# Patient Record
Sex: FEMALE | Race: BLACK OR AFRICAN AMERICAN
[De-identification: names, ages, dates, MRNs, and addresses within clinical notes are randomized per-mention and may not be internally consistent; named-entity substitution may affect disease eponyms.]

---

## 2015-02-17 VITALS
DIASTOLIC BLOOD PRESSURE: 64 MMHG | DIASTOLIC BLOOD PRESSURE: 64 MMHG | SYSTOLIC BLOOD PRESSURE: 120 MMHG | SYSTOLIC BLOOD PRESSURE: 120 MMHG | SYSTOLIC BLOOD PRESSURE: 120 MMHG | SYSTOLIC BLOOD PRESSURE: 120 MMHG | DIASTOLIC BLOOD PRESSURE: 64 MMHG | DIASTOLIC BLOOD PRESSURE: 64 MMHG | DIASTOLIC BLOOD PRESSURE: 64 MMHG | SYSTOLIC BLOOD PRESSURE: 120 MMHG

## 2017-12-06 ENCOUNTER — HOSPITAL ENCOUNTER (OUTPATIENT)
Dept: HOSPITAL 63 - CT | Age: 37
Discharge: HOME | End: 2017-12-06
Attending: NURSE PRACTITIONER
Payer: COMMERCIAL

## 2017-12-06 DIAGNOSIS — K82.8: ICD-10-CM

## 2017-12-06 DIAGNOSIS — M47.896: ICD-10-CM

## 2017-12-06 DIAGNOSIS — N83.8: Primary | ICD-10-CM

## 2017-12-06 PROCEDURE — 74177 CT ABD & PELVIS W/CONTRAST: CPT

## 2017-12-06 NOTE — RAD
Examination: CT of the abdomen pelvis with IV contrast



History: History of chronic right lower quadrant abdominal pain, nausea,

vomiting



Comparison: None available



Technique: Axial CT images of the abdomen pelvis were performed with IV

contrast. Coronal and sagittal reformats are performed





PQRS Compliance Statement:



One or more of the following individualized dose reduction techniques were

utilized for this examination:

1. Automated exposure control

2. Adjustment of the mA and/or kV according to patient size

3. Use of iterative reconstruction technique



Findings:



The bibasal lungs are clear.



No evidence of free air identified in the abdomen.



Examination is somewhat limited due to delayed imaging after contrast

administration. Grossly the visualized liver, spleen, adrenals grossly appears

unremarkable. The gallbladder is mildly distended.



The stomach is mildly distended.



The visualized pancreas grossly appears unremarkable.



The small bowel is nondilated.



The appendix is normal.



Feces and gas noted in the colon.



There is a cystic structure identified in the left adnexa measuring 3.5 cm

could be a left ovarian cyst or cystic lesion. Heterogeneous appearance of the

uterus with the hypodensity identified abutting the endometrium measuring 3.5

cm..



The bilateral kidneys enhance symmetrically.



The caliber of the aorta grossly appears unremarkable.



Minimal degenerative changes lumbar spine.





Impression:



1. No acute intra-abdominal findings.



2. Heterogeneous appearance of the uterus with hypodensity identified abutting

the endometrium measuring 3.5 cm probably fibroids. Ultrasound pelvis can be

considered for follow-up if this is a clinical concern.



3. A 3.5 cm cyst is identified in the left adnexa probably left ovarian cyst

or cystic lesion. Ultrasound pelvis is recommended.

## 2018-01-04 ENCOUNTER — HOSPITAL ENCOUNTER (OUTPATIENT)
Dept: HOSPITAL 63 - PMG | Age: 38
Discharge: HOME | End: 2018-01-04
Attending: NURSE PRACTITIONER
Payer: COMMERCIAL

## 2018-01-04 ENCOUNTER — HOSPITAL ENCOUNTER (OUTPATIENT)
Dept: HOSPITAL 63 - US | Age: 38
Discharge: HOME | End: 2018-01-04
Payer: COMMERCIAL

## 2018-01-04 DIAGNOSIS — N83.202: Primary | ICD-10-CM

## 2018-01-04 DIAGNOSIS — R94.6: ICD-10-CM

## 2018-01-04 DIAGNOSIS — D25.9: ICD-10-CM

## 2018-01-04 DIAGNOSIS — N83.201: ICD-10-CM

## 2018-01-04 DIAGNOSIS — E05.90: ICD-10-CM

## 2018-01-04 DIAGNOSIS — R06.02: Primary | ICD-10-CM

## 2018-01-04 LAB
ALBUMIN SERPL-MCNC: 3.2 G/DL (ref 3.4–5)
ALBUMIN/GLOB SERPL: 0.9 {RATIO} (ref 1–1.7)
ALP SERPL-CCNC: 112 U/L (ref 46–116)
ALT SERPL-CCNC: 55 U/L (ref 14–59)
ANION GAP SERPL CALC-SCNC: 10 MMOL/L (ref 6–14)
AST SERPL-CCNC: 32 U/L (ref 15–37)
BILIRUB SERPL-MCNC: 0.2 MG/DL (ref 0.2–1)
BUN/CREAT SERPL: 16 (ref 6–20)
CA-I SERPL ISE-MCNC: 8 MG/DL (ref 7–20)
CALCIUM SERPL-MCNC: 9 MG/DL (ref 8.5–10.1)
CHLORIDE SERPL-SCNC: 107 MMOL/L (ref 98–107)
CO2 SERPL-SCNC: 25 MMOL/L (ref 21–32)
CREAT SERPL-MCNC: 0.5 MG/DL (ref 0.6–1)
GFR SERPLBLD BASED ON 1.73 SQ M-ARVRAT: 168 ML/MIN
GLOBULIN SER-MCNC: 3.7 G/DL (ref 2.2–3.8)
GLUCOSE SERPL-MCNC: 100 MG/DL (ref 70–99)
POTASSIUM SERPL-SCNC: 4 MMOL/L (ref 3.5–5.1)
PROT SERPL-MCNC: 6.9 G/DL (ref 6.4–8.2)
SODIUM SERPL-SCNC: 142 MMOL/L (ref 136–145)

## 2018-01-04 PROCEDURE — 84481 FREE ASSAY (FT-3): CPT

## 2018-01-04 PROCEDURE — 84439 ASSAY OF FREE THYROXINE: CPT

## 2018-01-04 PROCEDURE — 84443 ASSAY THYROID STIM HORMONE: CPT

## 2018-01-04 PROCEDURE — 76536 US EXAM OF HEAD AND NECK: CPT

## 2018-01-04 PROCEDURE — 80053 COMPREHEN METABOLIC PANEL: CPT

## 2018-01-04 PROCEDURE — 83880 ASSAY OF NATRIURETIC PEPTIDE: CPT

## 2018-01-04 PROCEDURE — 76856 US EXAM PELVIC COMPLETE: CPT

## 2018-01-04 PROCEDURE — 76830 TRANSVAGINAL US NON-OB: CPT

## 2018-01-04 PROCEDURE — 71046 X-RAY EXAM CHEST 2 VIEWS: CPT

## 2018-01-04 PROCEDURE — 36415 COLL VENOUS BLD VENIPUNCTURE: CPT

## 2018-01-04 PROCEDURE — 85379 FIBRIN DEGRADATION QUANT: CPT

## 2018-01-04 NOTE — RAD
Thyroid ultrasound, 1/4/2018:



History: Hyperthyroidism



The gland is generally enlarged with the right lobe measuring 6.4 x 2.4 x 2.4

cm while the left lobe measures 6.1 x 2.6 x 2.5 cm. The isthmus measures 0.8

cm AP dimension. The thyroid echo pattern is heterogeneous. The gland is

hypervascular.



There is a 6 mm smooth hyperechoic nodule present in the lower pole of the

right lobe of the gland. It does not demonstrate suspicious features.



There is a more complex 1.4 cm nodule present in the posterior aspect of the

lower pole of the right lobe of the gland. It demonstrates hypoechoic and

isoechoic components as well as a coarse calcification.



No discrete nodule is seen in the left lobe of the gland.



IMPRESSION:

1. Generally enlarged thyroid gland compatible with a toxic goiter.

2. Two right thyroid nodules as noted above. The larger one is mildly

suspicious due to the presence of internal calcification. Sonographic

follow-up is suggested.

## 2018-01-04 NOTE — RAD
Pelvic ultrasound, 1/4/2018:



History:  Abdominal pain, abnormal CT study



Transabdominal and transvaginal scans were obtained.



The uterus is enlarged measuring 13 cm in length. It demonstrates a

heterogeneous echo pattern. There is a 3.6 cm rounded, slightly hypoechoic,

heterogeneous mass in the posterior aspect of the uterus. This distorts the

central uterine echo complex, which measures approximately 1.1 cm in greatest

AP dimension. This posterior uterine mass was present on the 6/1/2016 study at

which time it measured approximately 5 cm. There is presumably represents a

uterine fibroid. A 1.8 cm hypoechoic nodule is seen anteriorly in the uterus.

Small nabothian cysts are noted in the cervical region.



There is a 2.2 cm predominantly cystic lesion in the right ovary. There are

low level internal echoes. This is probably a hemorrhagic cyst. The right

ovary is otherwise unremarkable.



The left ovary is within normal limits in size. The cystic structure seen in

the left adnexa on the 12/6/2017 CT study is not visible sonographically. This

may have been a functional cyst which has resolved.



The adnexal regions are otherwise unremarkable. A small amount of free fluid

is present in the pelvis.





IMPRESSION:

1. Fibroid uterus with the largest fibroid (3.6 cm) in the posterior aspect of

the uterus demonstrating an interval decrease in size since 6/1/2016.

2. Small right ovarian cyst containing debris, most likely a hemorrhagic cyst.

3. Small amount of free fluid in the pelvis.

## 2018-01-04 NOTE — RAD
2 views of the Chest  1/4/2018 2:00 AM



Indication: SHORTNESS OF BREATH



Comparison:  Chest radiograph August 12, 2016



Findings: There is no focal consolidation or infiltrate identified. There is

no effusion or pneumothorax. The cardiomediastinal silhouette and pulmonary

vasculature are within normal limits. No osseous abnormality is identified.



Impression: No evidence of acute cardiopulmonary process.

## 2018-01-05 ENCOUNTER — HOSPITAL ENCOUNTER (OUTPATIENT)
Dept: HOSPITAL 63 - CT | Age: 38
Discharge: HOME | End: 2018-01-05
Payer: COMMERCIAL

## 2018-01-05 DIAGNOSIS — R79.1: Primary | ICD-10-CM

## 2018-01-05 DIAGNOSIS — R91.8: ICD-10-CM

## 2018-01-05 LAB
T4 FREE SERPL-MCNC: 4.52 NG/DL (ref 0.76–1.46)
THYROID STIM HORMONE (TSH): < 0.007 UIU/ML (ref 0.36–3.74)

## 2018-01-05 PROCEDURE — 71275 CT ANGIOGRAPHY CHEST: CPT

## 2018-01-05 RX ADMIN — IOHEXOL ONE ML: 300 INJECTION, SOLUTION INTRAVENOUS at 11:33

## 2018-01-05 NOTE — RAD
INDICATION: Elevated d-dimer



COMPARISON: Chest x-ray 1 day prior



TECHNIQUE: Axial CT images obtained through the chest. Intravenous contrast

was utilized. Three-dimensional images processed per protocol.



FINDINGS:



There are some limitation secondary to contrast bolus timing and patient

motion.

No evidence of pneumothorax.

There are some minimal groundglass opacities within left greater than right

lung.

Portions of the thoracic aorta are obscured by motion. Ascending thoracic

aorta measures approximately 38 mm and descending thoracic aorta approximately

2 cm.

Triangle shaped soft tissue density anterior mediastinum.

Calcification at right lobe of thyroid.

There is some irregularity of the inferior endplate of T5 with some sclerosis

in the area.

No embolus in main, right main or left main pulmonary artery. Peripheral

evaluation is very limited secondary to patient motion and suboptimal contrast



IMPRESSION:



1. No embolus in main pulmonary arteries. Peripheral evaluation is very

limited secondary to motion.





2. Soft tissue density in the anterior mediastinum. Could be from causes such

as residual thymus.





3. Mild groundglass opacities in left greater than right lung. Could be

hypoventilatory changes or small airway inflammation.



4. Irregularity of the inferior endplate of T5. Could be from causes such at

Schmorl's node unless the patient has a history of injury to this region.





PQRS Compliance Statement:



One or more of the following individualized dose reduction techniques were

utilized for this examination:

1. Automated exposure control

2. Adjustment of the mA and/or kV according to patient size

3. Use of iterative reconstruction technique

## 2018-02-28 ENCOUNTER — HOSPITAL ENCOUNTER (OUTPATIENT)
Dept: HOSPITAL 61 - ECHO | Age: 38
Discharge: HOME | End: 2018-02-28
Attending: INTERNAL MEDICINE
Payer: COMMERCIAL

## 2018-02-28 DIAGNOSIS — I07.1: ICD-10-CM

## 2018-02-28 DIAGNOSIS — I27.20: Primary | ICD-10-CM

## 2018-02-28 PROCEDURE — 93306 TTE W/DOPPLER COMPLETE: CPT

## 2019-09-04 ENCOUNTER — HOSPITAL ENCOUNTER (OUTPATIENT)
Dept: HOSPITAL 63 - MAMMO | Age: 39
Discharge: HOME | End: 2019-09-04
Attending: PHYSICIAN ASSISTANT
Payer: MEDICAID

## 2019-09-04 DIAGNOSIS — N63.0: ICD-10-CM

## 2019-09-04 DIAGNOSIS — R92.8: Primary | ICD-10-CM

## 2019-09-04 PROCEDURE — 77066 DX MAMMO INCL CAD BI: CPT

## 2019-09-04 NOTE — RAD
DATE: 9/4/2019



EXAM: DIGITAL DIAGNOSTIC BILATERAL



HISTORY: Swelling in left breast after tetanus shot.



COMPARISON: 11/9/2016



This study was interpreted with the benefit of Computerized Aided Detection

(CAD).





Breast Density:  HETERO The breast parenchyma is heterogenously dense, which

could reduce sensitivity of mammography. Breast parenchyma level C.





FINDINGS: 

 2-D CC and MLO views are provided.



Right breast: There are no suspicious microcalcifications, masses or areas of

architectural distortion.



Left breast: There are no suspicious microcalcifications, masses or areas of

architectural distortion.



Bilateral mammograms compared to prior examinations and appears unchanged.

  





IMPRESSION: Negative bilateral mammogram. Note that a normal mammogram does

not preclude additional imaging if symptoms warrant.







BI-RADS CATEGORY: 1 NEGATIVE



RECOMMENDED FOLLOW-UP: 12M 12 MONTH FOLLOW-UP



PQRS compliance statement: Patient information was entered into a reminder

system with a target due date 9/4/2020 for the next mammogram.



Mammography is a sensitive method for finding small breast cancers, but it

does not detect them all and is not a substitute for careful clinical

examination.  A negative mammogram does not negate a clinically suspicious

finding and should not result in delay in biopsying a clinically suspicious

abnormality.



"Our facility is accredited by the [employer] Mammography

Program."

## 2019-12-14 ENCOUNTER — HOSPITAL ENCOUNTER (EMERGENCY)
Dept: HOSPITAL 63 - ER | Age: 39
Discharge: HOME | End: 2019-12-14
Payer: MEDICAID

## 2019-12-14 VITALS
DIASTOLIC BLOOD PRESSURE: 78 MMHG | SYSTOLIC BLOOD PRESSURE: 124 MMHG | SYSTOLIC BLOOD PRESSURE: 124 MMHG | SYSTOLIC BLOOD PRESSURE: 124 MMHG | SYSTOLIC BLOOD PRESSURE: 124 MMHG | DIASTOLIC BLOOD PRESSURE: 78 MMHG | DIASTOLIC BLOOD PRESSURE: 78 MMHG | DIASTOLIC BLOOD PRESSURE: 78 MMHG

## 2019-12-14 VITALS — HEIGHT: 72 IN | BODY MASS INDEX: 33.32 KG/M2 | WEIGHT: 246 LBS

## 2019-12-14 DIAGNOSIS — Z88.0: ICD-10-CM

## 2019-12-14 DIAGNOSIS — J06.9: Primary | ICD-10-CM

## 2019-12-14 DIAGNOSIS — E03.9: ICD-10-CM

## 2019-12-14 DIAGNOSIS — Z88.5: ICD-10-CM

## 2019-12-14 PROCEDURE — 99283 EMERGENCY DEPT VISIT LOW MDM: CPT

## 2019-12-14 NOTE — PHYS DOC
Past History


Past Medical History:  Hypothyroid


Past Surgical History:  , Other


Additional Past Surgical Histo:  UMBILICAL HERNIA


Smoking:  Non-smoker


Alcohol Use:  Occasionally


Drug Use:  None





Adult General


Chief Complaint


Chief Complaint:  SORE THROAT





HPI


HPI





Patient is a 89-year-old female presents with nasal congestion, sore throat, and

cough. This is been going on for the past 3 days and getting worse over time. 

She had a fever of 101 last night. No significant improvement with NyQuil, last

dose last night. No nausea or vomiting. No diarrhea. No dysuria or hematuria. 

Nothing really seems to make the symptoms better or worse. She has not been on 

any recent antibiotics. She just completed [employer] and has been around 

numerous sick patients.[]





Review of Systems


Review of Systems





Constitutional: See history of present illness[]


Eyes: Denies change in visual acuity, redness, or eye pain []


HENT: See history of present illness[]


Respiratory: Denies hemoptysis or shortness of breath []


Cardiovascular: No chest pain or palpitations[]


GI: Denies abdominal pain, nausea, vomiting, bloody stools or diarrhea []


: Denies dysuria or hematuria []


Musculoskeletal: Denies back pain or joint pain []


Integument: Denies rash or skin lesions []


Neurologic: Denies headache, focal weakness or sensory changes []


Endocrine: Denies polyuria or polydipsia []





All other systems were reviewed and found to be within normal limits, except as 

documented in this note.





Allergies


Allergies





Allergies








Coded Allergies Type Severity Reaction Last Updated Verified


 


  Penicillins Allergy Unknown  17 Yes


 


  morphine Allergy Unknown  17 Yes











Physical Exam


Physical Exam





Constitutional: Well developed, well nourished, no acute distress, non-toxic 

appearance. []


HENT: Normocephalic, atraumatic, bilateral external ears normal, oropharynx 

moist, no oral exudates, nose with mild clear rhinorrhea. Posterior pharyngeal 

streaking is present. []


Eyes: PERRLA, EOMI, conjunctiva normal, no discharge. [] 


Neck: Normal range of motion, no tenderness, supple, no stridor. [] 


Cardiovascular:Heart rate regular rhythm, no murmur []


Lungs & Thorax:  Bilateral breath sounds clear to auscultation, no increased 

work of breathing []


Abdomen: Bowel sounds normal, soft, no tenderness, no masses, no pulsatile 

masses. [] 


Skin: Warm, dry, no erythema, no rash. [] 


Back: No tenderness, no CVA tenderness. [] 


Extremities: No tenderness, no cyanosis, no clubbing, ROM intact, no edema. [] 


Neurologic: Alert and oriented X 3, normal motor function, normal sensory 

function, no focal deficits noted. []


Psychologic: Affect normal, judgement normal, mood normal. []





Current Patient Data


Vital Signs





                                   Vital Signs








  Date Time  Temp Pulse Resp B/P (MAP) Pulse Ox O2 Delivery O2 Flow Rate FiO2


 


19 13:05 98.3 92 20  99 Room Air  











EKG


EKG


[]





Radiology/Procedures


Radiology/Procedures


[]





Course & Med Decision Making


Course & Med Decision Making


Pertinent Labs and Imaging studies reviewed. (See chart for details)





ED course: Patient arrived, was placed in bed, and tolerated exam well. 

Discussed options with patient. Findings and plan were discussed with patient. 

She was discharged in improved condition. All questions were answered.





Medical decision making: Patient appears to have an upper respiratory infection.

 We'll start with symptomatic treatment. If this is not improving in 2 days she 

will have perception for antibiotics, that we'll cover both throat as well as 

lungs given her penicillin allergy. She also notes that she does get yeast 

infections when on antibiotics. Writing for the when necessary Diflucan. No 

evidence of sepsis, no airway compromise. No hypoxia.[]





Dragon Disclaimer


Dragon Disclaimer


This electronic medical record was generated, in whole or in part, using a voice

 recognition dictation system.





Departure


Departure:


Impression:  


   Primary Impression:  


   Upper respiratory infection


Disposition:  01 HOME, SELF-CARE


Condition:  IMPROVED


Referrals:  


FELICITAS ACEVEDO (PCP)


Follow-up in 2 days


Patient Instructions:  Upper Respiratory Infection, Adult





Additional Instructions:  


Drink plenty of fluids. Follow-up with your regular doctor in 2 days. Use the 

promethazine DM to help with cough and congestion. If no improvement in 2 days 

with that then take the azithromycin as prescribed. Use the Diflucan if 

necessary. Return to the ER if difficulty breathing or any other concerns.


Scripts


Fluconazole (DIFLUCAN) 150 Mg Tablet


1 TAB PO ONCE for candidiasis, #1 TAB


   Prov: MARTIN NOVAK DO         19 


Azithromycin (AZITHROMYCIN TABLET) 250 Mg Tablet


1 PKG PO UD for bronchitis for 5 Days, #6 TAB 0 Refills


   2 the first day followed by 1 for days 2-5


   Prov: MARTIN NOVAK DO         19 


D-Methorphan Hb/Prometh Hcl (PROMETHAZINE-DM SYRUP) 118 Ml Syrup


5 ML PO PRN Q4HRS for CONGESTION, #120 ML


   Prov: MARTIN NOVAK DO         19





Problem Qualifiers








   Primary Impression:  


   Upper respiratory infection


   URI type:  unspecified URI  Qualified Codes:  J06.9 - Acute upper respiratory

    infection, unspecified








MARTIN NOVAK DO          Dec 14, 2019 13:25

## 2020-01-27 ENCOUNTER — HOSPITAL ENCOUNTER (OUTPATIENT)
Dept: HOSPITAL 63 - PMG | Age: 40
Discharge: HOME | End: 2020-01-27
Attending: PHYSICIAN ASSISTANT
Payer: MEDICAID

## 2020-01-27 DIAGNOSIS — M25.552: Primary | ICD-10-CM

## 2020-01-27 PROCEDURE — 73502 X-RAY EXAM HIP UNI 2-3 VIEWS: CPT

## 2020-01-27 NOTE — RAD
EXAM: Pelvis and left hip, 3 views.

 

HISTORY: Pain. Popping.

 

COMPARISON: None.

 

FINDINGS: A frontal view the pelvis and frontal and frog-leg views of the 

left hip are obtained. There is no fracture, dislocation or subluxation.

 

IMPRESSION: No acute osseous finding.

 

Electronically signed by: Moraima Sharpe MD (1/27/2020 9:06 AM) [facility] [place]

## 2020-10-23 ENCOUNTER — HOSPITAL ENCOUNTER (OUTPATIENT)
Dept: HOSPITAL 63 - MAMMO | Age: 40
End: 2020-10-23
Attending: FAMILY MEDICINE
Payer: MEDICAID

## 2020-10-23 DIAGNOSIS — Z12.31: Primary | ICD-10-CM

## 2020-10-23 PROCEDURE — 77067 SCR MAMMO BI INCL CAD: CPT

## 2020-10-26 NOTE — RAD
BILATERAL SCREENING MAMMOGRAM

 

History: Routine screening.

 

Comparison:  11/9/2016 and 9/4/2019. 

 

Technique: Routine bilateral digital mammogram views were obtained.

 

Findings: 

Breast Tissue Density C : The breasts are heterogeneously dense, which may

obscure small masses.

 

There are no dominant masses, suspicious microcalcifications, or 

architectural distortion.

 

IMPRESSION:

No mammographic evidence of malignancy. Recommend routine screening.

 

BI-RADS category 1:  Negative.

 

The images were reviewed with computer aided detection.

 

Patient information is entered into the reminder system with a target due 

date for the next screening mammogram.

 

Mammography is the most sensitive method for finding small breast cancers,

but it does not detect them all and is not a substitute for careful 

clinical examination. A negative mammogram does not negate a clinically 

suspicious finding and should not result in delay in biopsying a 

clinically suspicious abnormality.

 

 "Our facility is accredited by the American College of [employer] 

Mammography Program."

 

Electronically signed by: Aramis Francis MD (10/26/2020 9:59 AM) [id number]

## 2021-03-03 ENCOUNTER — HOSPITAL ENCOUNTER (EMERGENCY)
Dept: HOSPITAL 63 - ER | Age: 41
Discharge: HOME | End: 2021-03-03
Payer: MEDICAID

## 2021-03-03 VITALS — SYSTOLIC BLOOD PRESSURE: 148 MMHG | DIASTOLIC BLOOD PRESSURE: 58 MMHG

## 2021-03-03 VITALS — HEIGHT: 72 IN | WEIGHT: 220.46 LBS | BODY MASS INDEX: 29.86 KG/M2

## 2021-03-03 DIAGNOSIS — S16.1XXA: Primary | ICD-10-CM
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
## 2021-03-03 DIAGNOSIS — E03.9: ICD-10-CM

## 2021-03-03 DIAGNOSIS — Z98.890: ICD-10-CM

## 2021-03-03 DIAGNOSIS — S29.012A: ICD-10-CM

## 2021-03-03 PROCEDURE — 72128 CT CHEST SPINE W/O DYE: CPT

## 2021-03-03 PROCEDURE — 99285 EMERGENCY DEPT VISIT HI MDM: CPT

## 2021-03-03 PROCEDURE — 72125 CT NECK SPINE W/O DYE: CPT
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
## 2021-06-25 ENCOUNTER — HOSPITAL ENCOUNTER (OUTPATIENT)
Dept: HOSPITAL 63 - RAD | Age: 41
End: 2021-06-25
Attending: FAMILY MEDICINE
Payer: MEDICAID

## 2021-06-25 DIAGNOSIS — M47.817: Primary | ICD-10-CM

## 2021-06-25 DIAGNOSIS — M25.78: ICD-10-CM

## 2021-06-25 PROCEDURE — 72100 X-RAY EXAM L-S SPINE 2/3 VWS: CPT

## 2021-06-25 NOTE — RAD
EXAM:  XR LUMBAR SPINE 2-3V 6/25/2021 1:17 PM



CLINICAL INDICATION:  Back pain since March, correlate



COMPARISON:  None



TECHNIQUE:  3 views of the lumbar spine



FINDINGS:  There 5 nonrib-bearing lumbar vertebral bodies. No acute fracture. Alignment is normal. No
 significant disc space narrowing. There are tiny anterior osteophytes in the upper lumbar spine. The
 facet joints are normal.



IMPRESSION:  No acute osseous abnormality. Minimal degenerative changes.



Electronically signed by: Caty Knight MD (6/25/2021 2:50 PM) [id number]

## 2021-07-12 ENCOUNTER — HOSPITAL ENCOUNTER (OUTPATIENT)
Dept: HOSPITAL 61 - KCIC MRI | Age: 41
End: 2021-07-12
Payer: MEDICAID

## 2021-07-12 DIAGNOSIS — M48.07: ICD-10-CM

## 2021-07-12 DIAGNOSIS — M51.27: ICD-10-CM

## 2021-07-12 DIAGNOSIS — M47.817: Primary | ICD-10-CM

## 2021-07-12 PROCEDURE — 72148 MRI LUMBAR SPINE W/O DYE: CPT
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
## 2021-09-08 ENCOUNTER — HOSPITAL ENCOUNTER (OUTPATIENT)
Dept: HOSPITAL 61 - KCIC MRI | Age: 41
End: 2021-09-08
Attending: PHYSICIAN ASSISTANT
Payer: MEDICAID

## 2021-09-08 DIAGNOSIS — R51.9: ICD-10-CM

## 2021-09-08 DIAGNOSIS — I67.1: Primary | ICD-10-CM

## 2021-09-08 DIAGNOSIS — Z82.49: ICD-10-CM

## 2021-09-08 PROCEDURE — 70544 MR ANGIOGRAPHY HEAD W/O DYE: CPT

## 2021-09-08 NOTE — KCIC
EXAMINATION: Magnetic resonance angiography (MRA) of the brain without contrast 9/8/2021 9:35 AM



HISTORY: New pressure in the back of the head without relief. 



TECHNIQUE: Noncontrast 5 magnetic resonance angiography of the Stebbins of [place] was obtained. Maximum
 intensity projection images are provided. Family history of aneurysm.



COMPARISON: 10/30/2017



FINDINGS:



Intracranial segments of internal carotid arteries are normal in course and caliber. Ophthalmic segme
nts are widely patent. A1 segments of anterior cerebral arteries are patent. There is a broad-based 2
 mm x 2 mm aneurysm arising from the right P2 segment of anterior cerebral artery (series 7, image 12
3). Middle cerebral arteries are normal in course and caliber with patent sylvian branches.



Vertebral arteries are codominant. Posterior inferior cerebellar arteries are widely patent. Anterior
 inferior cerebellar arteries are patent. Superior cerebellar arteries are patent. Posterior cerebral
 arteries are normal in course and caliber. There is no vascular malformation or high-grade stenosis/
large vessel occlusion involving Stebbins of [place].



Diffusion weighted images reveal no hyperintensities to suggest acute cerebral infarction.



IMPRESSION:





1. 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
rebral artery immediately distal to the anterior communicating artery.

2. No vascular malformation or high-grade stenosis/large vessel occlusion.



Electronically signed by: Madeline Mae MD (9/8/2021 2:22 PM) [id number]

## 2021-09-11 ENCOUNTER — HOSPITAL ENCOUNTER (EMERGENCY)
Dept: HOSPITAL 63 - ER | Age: 41
Discharge: HOME | End: 2021-09-11
Payer: MEDICAID

## 2021-09-11 VITALS — HEIGHT: 72 IN | WEIGHT: 220.46 LBS | BODY MASS INDEX: 29.86 KG/M2

## 2021-09-11 VITALS — SYSTOLIC BLOOD PRESSURE: 120 MMHG | DIASTOLIC BLOOD PRESSURE: 71 MMHG

## 2021-09-11 DIAGNOSIS — Z88.0: ICD-10-CM

## 2021-09-11 DIAGNOSIS — F41.9: Primary | ICD-10-CM

## 2021-09-11 DIAGNOSIS — Z88.5: ICD-10-CM

## 2021-09-11 DIAGNOSIS — E03.9: ICD-10-CM

## 2021-09-11 DIAGNOSIS — R07.89: ICD-10-CM

## 2021-09-11 LAB
ALBUMIN SERPL-MCNC: 3.8 G/DL (ref 3.4–5)
ALBUMIN/GLOB SERPL: 1.2 {RATIO} (ref 1–1.7)
ALP SERPL-CCNC: 65 U/L (ref 46–116)
ALT SERPL-CCNC: 23 U/L (ref 14–59)
ANION GAP SERPL CALC-SCNC: 13 MMOL/L (ref 6–14)
APTT PPP: YELLOW S
AST SERPL-CCNC: 31 U/L (ref 15–37)
BACTERIA #/AREA URNS HPF: (no result) /HPF
BASOPHILS # BLD AUTO: 0.1 X10^3/UL (ref 0–0.2)
BASOPHILS NFR BLD: 3 % (ref 0–3)
BILIRUB SERPL-MCNC: 0.6 MG/DL (ref 0.2–1)
BILIRUB UR QL STRIP: (no result)
BUN/CREAT SERPL: 13 (ref 6–20)
CA-I SERPL ISE-MCNC: 10 MG/DL (ref 7–20)
CALCIUM SERPL-MCNC: 8.1 MG/DL (ref 8.5–10.1)
CHLORIDE SERPL-SCNC: 99 MMOL/L (ref 98–107)
CO2 SERPL-SCNC: 24 MMOL/L (ref 21–32)
CREAT SERPL-MCNC: 0.8 MG/DL (ref 0.6–1)
EOSINOPHIL NFR BLD: 0 X10^3/UL (ref 0–0.7)
EOSINOPHIL NFR BLD: 1 % (ref 0–3)
ERYTHROCYTE [DISTWIDTH] IN BLOOD BY AUTOMATED COUNT: 18.8 % (ref 11.5–14.5)
FIBRINOGEN PPP-MCNC: (no result) MG/DL
GFR SERPLBLD BASED ON 1.73 SQ M-ARVRAT: 95.6 ML/MIN
GLOBULIN SER-MCNC: 3.2 G/DL (ref 2.2–3.8)
GLUCOSE SERPL-MCNC: 87 MG/DL (ref 70–99)
GLUCOSE UR STRIP-MCNC: (no result) MG/DL
HCT VFR BLD CALC: 31 % (ref 36–47)
HGB BLD-MCNC: 10.2 G/DL (ref 12–15.5)
LYMPHOCYTES # BLD: 1.2 X10^3/UL (ref 1–4.8)
LYMPHOCYTES NFR BLD AUTO: 24 % (ref 24–48)
MCH RBC QN AUTO: 28 PG (ref 25–35)
MCHC RBC AUTO-ENTMCNC: 33 G/DL (ref 31–37)
MCV RBC AUTO: 87 FL (ref 79–100)
MONO #: 0.5 X10^3/UL (ref 0–1.1)
MONOCYTES NFR BLD: 9 % (ref 0–9)
NEUT #: 3.2 X10^3UL (ref 1.8–7.7)
NEUTROPHILS NFR BLD AUTO: 64 % (ref 31–73)
NITRITE UR QL STRIP: (no result)
PLATELET # BLD AUTO: 249 X10^3/UL (ref 140–400)
POTASSIUM SERPL-SCNC: 3.6 MMOL/L (ref 3.5–5.1)
PROT SERPL-MCNC: 7 G/DL (ref 6.4–8.2)
RBC # BLD AUTO: 3.58 X10^6/UL (ref 3.5–5.4)
RBC #/AREA URNS HPF: (no result) /HPF (ref 0–2)
SODIUM SERPL-SCNC: 136 MMOL/L (ref 136–145)
SP GR UR STRIP: 1.01
SQUAMOUS #/AREA URNS LPF: (no result) /LPF
UROBILINOGEN UR-MCNC: 0.2 MG/DL
WBC # BLD AUTO: 5 X10^3/UL (ref 4–11)
WBC #/AREA URNS HPF: (no result) /HPF (ref 0–4)

## 2021-09-11 PROCEDURE — 87086 URINE CULTURE/COLONY COUNT: CPT

## 2021-09-11 PROCEDURE — 36415 COLL VENOUS BLD VENIPUNCTURE: CPT

## 2021-09-11 PROCEDURE — 85025 COMPLETE CBC W/AUTO DIFF WBC: CPT

## 2021-09-11 PROCEDURE — 99285 EMERGENCY DEPT VISIT HI MDM: CPT

## 2021-09-11 PROCEDURE — 83880 ASSAY OF NATRIURETIC PEPTIDE: CPT

## 2021-09-11 PROCEDURE — 71045 X-RAY EXAM CHEST 1 VIEW: CPT

## 2021-09-11 PROCEDURE — 81001 URINALYSIS AUTO W/SCOPE: CPT

## 2021-09-11 PROCEDURE — 84484 ASSAY OF TROPONIN QUANT: CPT

## 2021-09-11 PROCEDURE — 81025 URINE PREGNANCY TEST: CPT

## 2021-09-11 PROCEDURE — 93005 ELECTROCARDIOGRAM TRACING: CPT

## 2021-09-11 PROCEDURE — 80053 COMPREHEN METABOLIC PANEL: CPT

## 2021-09-11 NOTE — RAD
XR CHEST 1V



CLINICAL INDICATIONS: Chest pain:  



COMPARISON: January 4, 2018.



Findings: No acute lung infiltrate or pleural effusion or pulmonary edema or lung mass or pneumothora
x is seen.  The heart size, pulmonary vasculature, mediastinum and both danish are unremarkable. 



IMPRESSION: No acute radiographic abnormality is seen.



Electronically signed by: Juliocesar Foster MD (9/11/2021 12:34 PM) [id number]

## 2021-09-11 NOTE — EKG
[facility] [address], [place] [address]

Test Date:    2021               Test Time:    15:53:07

Pat Name:     MASOUD MOREIRA            Department:   

Patient ID:   [id number]           Room:          

Gender:       F                        Technician:   MADHAV

:          1980               Requested By: SONNY THOMPSON

Order Number: [id number]            Reading MD:   Sergio Blount

                                 Measurements

Intervals                              Axis          

Rate:         66                       P:            90

DE:           182                      QRS:          66

QRSD:         86                       T:            47

QT:           390                                    

QTc:          411                                    

                           Interpretive Statements

SINUS RHYTHM

NORMAL ECG

RI6.02

Compared to ECG 2021 10:35:15

No significant changes

Electronically Signed On 2021 13:29:24 CDT by Sergio Blount

## 2021-09-11 NOTE — PHYS DOC
Past History


Past Medical History:  Hypothyroid


Additional Past Medical Histor:  brain aneursym


Past Surgical History:  , Other


Additional Past Surgical Histo:  UMBILICAL HERNIA


Smoking:  Non-smoker


Alcohol Use:  Occasionally


Drug Use:  None





General Adult


EDM:


Chief Complaint:  CHEST PAIN





HPI:


HPI:





Patient is a 41-year-old female presents with chest pain.  Patient states that 

pain started about 10 AM this morning after leaving her PCP.  "My doctor told me

this morning that I had an aneurysm and I think that is causing me to have chest

pressure".  "I feel like I might be having an anxiety attack".  Patient denies 

radiation of pain.  Denies nausea/vomiting.,  Shortness of breath.  Patient 

denies taking anything prior to arrival.  History of hypothyroid.





Review of Systems:


Review of Systems:


Constitutional:  Denies fever or chills 


Eyes:  Denies change in visual acuity 


HENT:  Denies nasal congestion or sore throat 


Respiratory:  Denies cough or shortness of breath 


Cardiovascular: Reports chest pressure


GI:  Denies abdominal pain, nausea, vomiting, bloody stools or diarrhea 


: Denies dysuria 


Musculoskeletal:  Denies back pain or joint pain 


Integument:  Denies rash 


Neurologic:  Denies headache, focal weakness or sensory changes 


Endocrine:  Denies polyuria or polydipsia 


Lymphatic:  Denies swollen glands 


Psychiatric:  Denies depression or anxiety





Current Medications:


Current Meds:





Current Medications








 Medications


  (Trade)  Dose


 Ordered  Sig/Alexandre  Start Time


 Stop Time Status Last Admin


Dose Admin


 


 Lorazepam


  (Ativan Inj)  1 mg  1X  ONCE  21 12:15


 21 12:16 DC  





 


 Sodium Chloride  1,000 ml @ 


 1,000 mls/hr  Q1H  21 11:00


 21 11:59 DC  














Allergies:


Allergies:





Allergies








Coded Allergies Type Severity Reaction Last Updated Verified


 


  Penicillins Allergy Unknown  17 Yes


 


  morphine Allergy Unknown  17 Yes











Physical Exam:


PE:





Constitutional: Well developed, well nourished, no acute distress, non-toxic 

appearance. []


HENT: Normocephalic, atraumatic, bilateral external ears normal, oropharynx 

moist, no oral exudates, nose normal. []


Eyes: PERRLA, EOMI, conjunctiva normal, no discharge. [] 


Neck: Normal range of motion, no tenderness, supple, no stridor. [] 


Cardiovascular:Heart rate regular rhythm, no murmur []


Lungs & Thorax:  Bilateral breath sounds clear to auscultation []


Abdomen: Bowel sounds normal, soft, no tenderness, no masses, no pulsatile 

masses. [] 


Skin: Warm, dry, no erythema, no rash. [] 


Back: No tenderness, no CVA tenderness. [] 


Extremities: No tenderness, no cyanosis, no clubbing, ROM intact, no edema. [] 


Neurologic: Alert and oriented X 3, normal motor function, normal sensory 

function, no focal deficits noted. []


Psychologic: Affect normal, judgement normal, mood normal. []





Current Patient Data:


Labs:





                                Laboratory Tests








Test


 21


10:43 21


11:58


 


White Blood Count


 5.0 x10^3/uL


(4.0-11.0) 





 


Red Blood Count


 3.58 x10^6/uL


(3.50-5.40) 





 


Hemoglobin


 10.2 g/dL


(12.0-15.5)  L 





 


Hematocrit


 31.0 %


(36.0-47.0)  L 





 


Mean Corpuscular Volume


 87 fL ()


 





 


Mean Corpuscular Hemoglobin 28 pg (25-35)   


 


Mean Corpuscular Hemoglobin


Concent 33 g/dL


(31-37) 





 


Red Cell Distribution Width


 18.8 %


(11.5-14.5)  H 





 


Platelet Count


 249 x10^3/uL


(140-400) 





 


Neutrophils (%) (Auto) 64 % (31-73)   


 


Lymphocytes (%) (Auto) 24 % (24-48)   


 


Monocytes (%) (Auto) 9 % (0-9)   


 


Eosinophils (%) (Auto) 1 % (0-3)   


 


Basophils (%) (Auto) 3 % (0-3)   


 


Neutrophils # (Auto)


 3.2 x10^3uL


(1.8-7.7) 





 


Lymphocytes # (Auto)


 1.2 x10^3/uL


(1.0-4.8) 





 


Monocytes # (Auto)


 0.5 x10^3/uL


(0.0-1.1) 





 


Eosinophils # (Auto)


 0.0 x10^3/uL


(0.0-0.7) 





 


Basophils # (Auto)


 0.1 x10^3/uL


(0.0-0.2) 





 


Sodium Level


 136 mmol/L


(136-145) 





 


Potassium Level


 3.6 mmol/L


(3.5-5.1) 





 


Chloride Level


 99 mmol/L


() 





 


Carbon Dioxide Level


 24 mmol/L


(21-32) 





 


Anion Gap 13 (6-14)   


 


Blood Urea Nitrogen


 10 mg/dL


(7-20) 





 


Creatinine


 0.8 mg/dL


(0.6-1.0) 





 


Estimated GFR


(Cockcroft-Gault) 95.6  


 





 


BUN/Creatinine Ratio 13 (6-20)   


 


Glucose Level


 87 mg/dL


(70-99) 





 


Calcium Level


 8.1 mg/dL


(8.5-10.1)  L 





 


Total Bilirubin


 0.6 mg/dL


(0.2-1.0) 





 


Aspartate Amino Transferase


(AST) 31 U/L (15-37)


 





 


Alanine Aminotransferase (ALT)


 23 U/L (14-59)


 





 


Alkaline Phosphatase


 65 U/L


() 





 


Troponin I Quantitative


 < 0.017 ng/mL


(0-0.055) 





 


NT-Pro-B-Type Natriuretic


Peptide 123 pg/mL


(0-124) 





 


Total Protein


 7.0 g/dL


(6.4-8.2) 





 


Albumin


 3.8 g/dL


(3.4-5.0) 





 


Albumin/Globulin Ratio 1.2 (1.0-1.7)   


 


POC Urine HCG, Qualitative


 


 hcg negative


(Negative)








Vital Signs:





                                   Vital Signs








  Date Time  Temp Pulse Resp B/P (MAP) Pulse Ox O2 Delivery O2 Flow Rate FiO2


 


21 10:32  71 20 120/71 (87) 100   











EKG:


EKG:


Sinus rhythm.  Heart rate 73 bpm.  Read by Dr. Ledezma.  []





Radiology/Procedures:


Radiology/Procedures:


[]XR CHEST 1V





CLINICAL INDICATIONS: Chest pain:  





COMPARISON: 2018.





Findings: No acute lung infiltrate or pleural effusion or pulmonary edema or 

lung mass or pneumothorax is seen.  The heart size, pulmonary vasculature, 

mediastinum and both danish are unremarkable. 





IMPRESSION: No acute radiographic abnormality is seen.





Electronically signed by: Juliocesar Foster MD (2021 12:34 PM) [id number]





Heart Score:


C/O Chest Pain:  Yes


HEART Score for Chest Pain:  








HEART Score for Chest Pain Response (Comments) Value


 


History Slighlty/Non-Suspicious 0


 


ECG Normal 0


 


Age < 45 0


 


Risk Factors No Risk Factors 0


 


Troponin < Normal Limit 0


 


Total  0








Risk Factors:


Risk Factors:  DM, Current or recent (<one month) smoker, HTN, HLP, family 

history of CAD, obesity.


Risk Scores:


Score 0 - 3:  2.5% MACE over next 6 weeks - Discharge Home


Score 4 - 6:  20.3% MACE over next 6 weeks - Admit for Clinical Observation


Score 7 - 10:  72.7% MACE over next 6 weeks - Early Invasive Strategies





Course & Med Decision Making:


Course & Med Decision Making


Pertinent Labs and Imaging studies reviewed. (See chart for details)





[] 41-year-old female presents with chest pressure that started at 10 AM this 

morning.  Patient states that she was told she had a brain aneurysm and 

immediately started having pressure in her chest.  Patient most likely is 

experiencing anxiety from recent diagnosis.  Offered to give patient 1 mg of 

Ativan to help with anxiety, but patient declined.  Patient is also refusing 

fluids.


Chest x-ray is unremarkable.  All labs unremarkable.  Troponin is negative.  Dis

cussed results with patient.  Explained patient that she would need to stay to 

get a repeat troponin level.


Repeat troponin is unchanged and nonconcerning.


Discussed all results with patient.  Patient most likely was having anxiety from

 recent doctor appointment.  Patient agrees with the assessment.  Advised the 

patient to follow-up with PCP next week.  Patient given strict return 

precautions.  Patient is hemodynamically stable upon disposition.





Dragon Disclaimer:


Dragon Disclaimer:


This electronic medical record was generated, in whole or in part, using a voice

 recognition dictation system.





Departure


Departure:


Impression:  


   Primary Impression:  


   Anxiety


   Additional Impression:  


   Chest pressure


Disposition:   HOME / SELF CARE / HOMELESS


Condition:  STABLE


Referrals:  


LIAM HAIR MD (PCP)


Patient Instructions:  Anxiety and Panic Attacks, Easy-to-Read





Additional Instructions:  


You were seen in the emergency room for anxiety and chest pressure.  All of your

 labs were unremarkable.  I think you most likely were having anxiety due to 

recent doctor appointment.  Please follow-up with your PCP next week.  Return to

 the emergency room if you have worsening symptoms or concerns





EMERGENCY DEPARTMENT GENERAL DISCHARGE INSTRUCTIONS





Thank you for coming to [facility] (ED) today and trusting us

 with you 


care.  We trust that you had a positivie experience in our Emergency Department.

  If you 


wish to speak to the department management, you may call the director at 

[phone].





YOUR FOLLOW UP INSTRUCTIONS ARE AS FOLLOWS:





1.  Do you have a private Doctor?  If you do not have a private doctor, please 

ask for a 


resource list of physicians or clinics that may be able to assist you with 

follow up care.





2.  The Emergency Physician has interpreted your x-rays.  The X-Ray specialist 

will also 


review them.  If there is a change in the findings, you will be notified in 48 

hours when at 


all possible.





3.  A lab test or culture has been done, your results will be reviewed and you 

will be 


notified if you need a change in treatment.





ADDITIONAL INSTRUCTIONS AND INFORMATION:





1.  Your care today has been supervised by a physician who is specially trained 

in emergency 


care.  Many problems require more than one evaluation for a complete diagnosis 

and 


treatment.  We recommend that you schedule your follow up appointment as 

recommended to 


ensure complete treatment of you illness or injury.  If you are unable to obtain

 follow up 


care and continue to have a problem, or if your condition worsens, we recommend 

that you 


return to the ED.





2.  We are not able to safely determine your condition over the phone nor are we

 able to 


give sound medical advice over the phone.  For these safety reasons, if you call

 for medical 


advice we will ask you to come to the ED for further evaluation.





3.  If you have any questions regarding these discharge instructions please call

 the ED at 


[phone].





SAFETY INFORMATION:





In the interest of safety, wellness, and injury prevention; we encourage you to 

wear your 


sealbelt, if you smoke; quite smoking, and we encourage family to use a 

protective helmet 


for bicycling and other sporting events that present an increased risk for head 

injury.





IF YOUR SYMPTOMS WORSEN OR NEW SYMPTOMS DEVELOP, OR YOU HAVE CONCERNS ABOUT YOUR

 CONDITION; 


OR IF YOUR CONDITION WORSENS WHILE YOU ARE WAITING FOR YOUR FOLLOW UP 

APPOINTMENT; EITHER 


CONTACT YOUR PRIMARY CARE DOCTOR, THE PHYSICIAN WHOSE NAME AND NUMBER YOU WERE 

GIVEN, OR 


RETURN TO THE ED IMMEDIATELY.











SONNY THOMPSON               Sep 11, 2021 12:32

## 2021-09-11 NOTE — EKG
[facility] [address], [place] [address]

Test Date:    2021               Test Time:    10:35:15

Pat Name:     MASOUD MOREIRA            Department:   

Patient ID:   [id number]           Room:          

Gender:       F                        Technician:   MADHAV

:          1980               Requested By: SONNY THOMPSON

Order Number: [id number]            Reading MD:   Sergio Blount

                                 Measurements

Intervals                              Axis          

Rate:         73                       P:            72

HI:           164                      QRS:          74

QRSD:         90                       T:            51

QT:           382                                    

QTc:          424                                    

                           Interpretive Statements

SINUS RHYTHM

NORMAL ECG

RI6.02

Compared to ECG 2013 01:25:29

No significant changes

Electronically Signed On 2021 13:32:15 CDT by Sergio Blount

## 2021-10-10 ENCOUNTER — HOSPITAL ENCOUNTER (EMERGENCY)
Dept: HOSPITAL 63 - ER | Age: 41
Discharge: HOME | End: 2021-10-10
Payer: MEDICAID

## 2021-10-10 VITALS — BODY MASS INDEX: 30.52 KG/M2 | WEIGHT: 225.31 LBS | HEIGHT: 72 IN

## 2021-10-10 VITALS — SYSTOLIC BLOOD PRESSURE: 156 MMHG | DIASTOLIC BLOOD PRESSURE: 88 MMHG

## 2021-10-10 DIAGNOSIS — Z98.890: ICD-10-CM

## 2021-10-10 DIAGNOSIS — Z98.51: ICD-10-CM

## 2021-10-10 DIAGNOSIS — R07.89: Primary | ICD-10-CM

## 2021-10-10 DIAGNOSIS — F43.9: ICD-10-CM

## 2021-10-10 LAB
ALBUMIN SERPL-MCNC: 3.5 G/DL (ref 3.4–5)
ALBUMIN/GLOB SERPL: 1.1 {RATIO} (ref 1–1.7)
ALP SERPL-CCNC: 57 U/L (ref 46–116)
ALT SERPL-CCNC: 32 U/L (ref 14–59)
AMPHETAMINE/METHAMPHETAMINE: (no result)
ANION GAP SERPL CALC-SCNC: 9 MMOL/L (ref 6–14)
APTT PPP: YELLOW S
AST SERPL-CCNC: 35 U/L (ref 15–37)
BACTERIA #/AREA URNS HPF: (no result) /HPF
BARBITURATES UR-MCNC: (no result) UG/ML
BASOPHILS # BLD AUTO: 0.1 X10^3/UL (ref 0–0.2)
BASOPHILS NFR BLD: 1 % (ref 0–3)
BENZODIAZ UR-MCNC: (no result) UG/L
BILIRUB SERPL-MCNC: 0.3 MG/DL (ref 0.2–1)
BILIRUB UR QL STRIP: (no result)
BUN/CREAT SERPL: 13 (ref 6–20)
CA-I SERPL ISE-MCNC: 9 MG/DL (ref 7–20)
CALCIUM SERPL-MCNC: 8.6 MG/DL (ref 8.5–10.1)
CANNABINOIDS UR-MCNC: (no result) UG/L
CHLORIDE SERPL-SCNC: 103 MMOL/L (ref 98–107)
CO2 SERPL-SCNC: 25 MMOL/L (ref 21–32)
COCAINE UR-MCNC: (no result) NG/ML
CREAT SERPL-MCNC: 0.7 MG/DL (ref 0.6–1)
EOSINOPHIL NFR BLD: 0.1 X10^3/UL (ref 0–0.7)
EOSINOPHIL NFR BLD: 2 % (ref 0–3)
ERYTHROCYTE [DISTWIDTH] IN BLOOD BY AUTOMATED COUNT: 18.4 % (ref 11.5–14.5)
FIBRINOGEN PPP-MCNC: (no result) MG/DL
GFR SERPLBLD BASED ON 1.73 SQ M-ARVRAT: 111.6 ML/MIN
GLOBULIN SER-MCNC: 3.3 G/DL (ref 2.2–3.8)
GLUCOSE SERPL-MCNC: 98 MG/DL (ref 70–99)
GLUCOSE UR STRIP-MCNC: (no result) MG/DL
HCT VFR BLD CALC: 29.6 % (ref 36–47)
HGB BLD-MCNC: 9.4 G/DL (ref 12–15.5)
LIPASE: 132 U/L (ref 73–393)
LYMPHOCYTES # BLD: 1.5 X10^3/UL (ref 1–4.8)
LYMPHOCYTES NFR BLD AUTO: 34 % (ref 24–48)
MAGNESIUM SERPL-MCNC: 1.8 MG/DL (ref 1.8–2.4)
MCH RBC QN AUTO: 28 PG (ref 25–35)
MCHC RBC AUTO-ENTMCNC: 32 G/DL (ref 31–37)
MCV RBC AUTO: 90 FL (ref 79–100)
METHADONE SERPL-MCNC: (no result) NG/ML
MONO #: 0.5 X10^3/UL (ref 0–1.1)
MONOCYTES NFR BLD: 11 % (ref 0–9)
NEUT #: 2.2 X10^3UL (ref 1.8–7.7)
NEUTROPHILS NFR BLD AUTO: 52 % (ref 31–73)
NITRITE UR QL STRIP: (no result)
OPIATES UR-MCNC: (no result) NG/ML
PCP SERPL-MCNC: (no result) MG/DL
PLATELET # BLD AUTO: 232 X10^3/UL (ref 140–400)
POTASSIUM SERPL-SCNC: 3.6 MMOL/L (ref 3.5–5.1)
PROT SERPL-MCNC: 6.8 G/DL (ref 6.4–8.2)
RBC # BLD AUTO: 3.29 X10^6/UL (ref 3.5–5.4)
RBC #/AREA URNS HPF: (no result) /HPF (ref 0–2)
SODIUM SERPL-SCNC: 137 MMOL/L (ref 136–145)
SP GR UR STRIP: 1.01
SQUAMOUS #/AREA URNS LPF: (no result) /LPF
UROBILINOGEN UR-MCNC: 0.2 MG/DL
WBC # BLD AUTO: 4.2 X10^3/UL (ref 4–11)
WBC #/AREA URNS HPF: (no result) /HPF (ref 0–4)

## 2021-10-10 PROCEDURE — 36415 COLL VENOUS BLD VENIPUNCTURE: CPT

## 2021-10-10 PROCEDURE — 83690 ASSAY OF LIPASE: CPT

## 2021-10-10 PROCEDURE — 87491 CHLMYD TRACH DNA AMP PROBE: CPT

## 2021-10-10 PROCEDURE — 93005 ELECTROCARDIOGRAM TRACING: CPT

## 2021-10-10 PROCEDURE — 87086 URINE CULTURE/COLONY COUNT: CPT

## 2021-10-10 PROCEDURE — 87591 N.GONORRHOEAE DNA AMP PROB: CPT

## 2021-10-10 PROCEDURE — 85379 FIBRIN DEGRADATION QUANT: CPT

## 2021-10-10 PROCEDURE — 81025 URINE PREGNANCY TEST: CPT

## 2021-10-10 PROCEDURE — 85025 COMPLETE CBC W/AUTO DIFF WBC: CPT

## 2021-10-10 PROCEDURE — 80053 COMPREHEN METABOLIC PANEL: CPT

## 2021-10-10 PROCEDURE — 80307 DRUG TEST PRSMV CHEM ANLYZR: CPT

## 2021-10-10 PROCEDURE — 71045 X-RAY EXAM CHEST 1 VIEW: CPT

## 2021-10-10 PROCEDURE — 81001 URINALYSIS AUTO W/SCOPE: CPT

## 2021-10-10 PROCEDURE — 84484 ASSAY OF TROPONIN QUANT: CPT

## 2021-10-10 PROCEDURE — 83735 ASSAY OF MAGNESIUM: CPT

## 2021-10-10 NOTE — RAD
EXAM: Chest, single view.



HISTORY: Chest pain.



COMPARISON: 9/11/2021



FINDINGS: A frontal view of the chest is obtained. There is no infiltrate, pleural effusion or pneumo
thorax. There is slight increased opacification of the lower thorax due to asymmetric overlying soft 
tissue. There is a stable cardiac silhouette. There is a hypoplastic or partially resected left first
 rib.



IMPRESSION: No acute pulmonary finding.



Electronically signed by: Moraima Sharpe MD (10/10/2021 11:36 AM) [id number]

## 2021-10-10 NOTE — EKG
[facility] [address], [place] [address]

Test Date:    2021-10-10               Test Time:    09:43:05

Pat Name:     MASOUD MOREIRA            Department:   

Patient ID:   [id number]           Room:          

Gender:       F                        Technician:   LAVINIA

:          1980               Requested By: DREAD BRIAN

Order Number: [id number]            Reading MD:   Sergio Blount

                                 Measurements

Intervals                              Axis          

Rate:         67                       P:            90

IL:           176                      QRS:          56

QRSD:         80                       T:            28

QT:           366                                    

QTc:          389                                    

                           Interpretive Statements

SINUS RHYTHM

Electronically Signed On 10- 16:35:57 CDT by Sergio Blount

## 2021-10-10 NOTE — PHYS DOC
Past History


Past Medical History:  Hypothyroid


Additional Past Medical Histor:  brain aneursym


Past Surgical History:  , Tubal ligation, Other


Additional Past Surgical Histo:  UMBILICAL HERNIA


Smoking:  Non-smoker


Alcohol Use:  None


Drug Use:  None





General Adult


EDM:


Chief Complaint:  CHEST PAIN





HPI:


HPI:


41-year-old female past medical history of hypothyroidism (presenting with 

palpitations) and chronic back pain presents to the ED with complaints of " I 

was asleep, I have all kinds of stuff going on right now."  Patient starts 

crying and reports midsternal chest tightness, nonradiating and constant for the

past 4 days stating that she is sweating when she sleeps.  Reports she feels 

like her breasts are heavy, similar sensation when she was breast-feeding.  Does

report associated racing thoughts and no sleep for the past 24 hours.  Reports 

normal bowel movement this morning, brown in color.  Is asking for urinalysis 

stating she was diagnosed with E. coli 3 weeks ago and completed Macrobid-

complains of dysuria and increased frequency. Reports irregular menses. Had a 

period 1 week ago and started having vaginal bleeding yesterday. Stressors inc

lude her 16-year-old daughter coming out and concern her partner of four years, 

cheated on her.  Is requesting a Chlamydia and gonorrhea test.  Has a history of

a cardiac work-up "years ago" due to her hyperthyroidism but no history of a 

stress test. Is an RN.  No personal or family history of AAA, AAD, CTD (ehlos 

danlos or marfans), cardiac arrhythmias (need for AICD), or clotting disorders. 

Reports her biological sister passed away at 50 years of age 2/2 hypoxia and a 

suspected heart attack.  Patient denies any alcohol or cocaine abuse.





Review of Systems:


Review of Systems:


Constitutional:  Denies fever or chills 


Eyes:  Denies change in visual acuity 


HENT:  Denies nasal congestion or sore throat 


Respiratory:  Denies cough or shortness of breath or hemoptysis


Cardiovascular:  Denies syncope or edema 


GI:  Denies nausea, vomiting, bloody stools or diarrhea 


: Denies flank pain or flulike symptoms


Musculoskeletal:  Denies back pain or joint pain 


Integument:  Denies rash or desquamation


Neurologic:  Denies headache, focal weakness or sensory changes 


Endocrine:  Denies polyuria or polydipsia 


Lymphatic:  Denies swollen glands 


Psychiatric:  Denies depression or anxiety





Allergies:


Allergies:





Allergies








Coded Allergies Type Severity Reaction Last Updated Verified


 


  Penicillins Allergy Unknown  17 Yes


 


  morphine Allergy Unknown  17 Yes











Physical Exam:


PE:





Constitutional: Well developed, well nourished, no acute distress, non-toxic 

appearance, patient was sleeping comfortably when I entered her room


HENT: Normocephalic, atraumatic, moist mucous membranes


Eyes: EOMI, conjunctiva normal, no discharge.  


Neck: Normal range of motion,  supple, 


Cardiovascular: S1/2 present, regular rhythm


Lungs & Thorax: Speaking in full sentences, bilateral equal chest rise, no 

tachypnea or increased work of breathing


Abdomen:  soft, no tenderness, no rigidity or guarding


Skin: Warm, dry, no erythema, no rash. [] 


Extremities: No tenderness, no cyanosis, no lower extremity edema


Neurologic: Alert and oriented X 3, normal motor function, normal sensory 

function, no focal deficits noted. []


Psychologic: crying on exam, appears very overwhelmed





Current Patient Data:


Labs:





                                Laboratory Tests








Test


 10/10/21


09:56 10/10/21


10:03 10/10/21


10:12 10/10/21


11:12


 


Urine Collection Type Unknown     


 


Urine Color Yellow     


 


Urine Clarity Hazy     


 


Urine pH 6.0     


 


Urine Specific Gravity 1.010     


 


Urine Protein


 Neg


(NEG-TRACE) 


 


 





 


Urine Glucose (UA)


 Neg mg/dL


(NEG) 


 


 





 


Urine Ketones (Stick)


 Neg mg/dL


(NEG) 


 


 





 


Urine Blood Large (NEG)     


 


Urine Nitrite Neg (NEG)     


 


Urine Bilirubin Neg (NEG)     


 


Urine Urobilinogen Dipstick


 0.2 mg/dL (0.2


mg/dL) 


 


 





 


Urine Leukocyte Esterase Trace (NEG)     


 


Urine RBC


 3-5 /HPF (0-2)


 


 


 





 


Urine WBC


 5-10 /HPF


(0-4) 


 


 





 


Urine Squamous Epithelial


Cells Many /LPF  


 


 


 





 


Urine Transitional Epithelial


Cells Few /LPF  


 


 


 





 


Urine Bacteria


 Few /HPF


(0-FEW) 


 


 





 


Urine Opiates Screen Neg (NEG)     


 


Urine Methadone Screen Neg (NEG)     


 


Urine Barbiturates Neg (NEG)     


 


Urine Phencyclidine Screen Neg (NEG)     


 


Urine


Amphetamine/Methamphetamine Neg (NEG)  


 


 


 





 


Urine Benzodiazepines Screen Neg (NEG)     


 


Urine Cocaine Screen Neg (NEG)     


 


Urine Cannabinoids Screen Neg (NEG)     


 


Urine Ethyl Alcohol Neg (NEG)     


 


White Blood Count


 


 4.2 x10^3/uL


(4.0-11.0) 


 





 


Red Blood Count


 


 3.29 x10^6/uL


(3.50-5.40)  L 


 





 


Hemoglobin


 


 9.4 g/dL


(12.0-15.5)  L 


 





 


Hematocrit


 


 29.6 %


(36.0-47.0)  L 


 





 


Mean Corpuscular Volume


 


 90 fL ()


 


 





 


Mean Corpuscular Hemoglobin  28 pg (25-35)    


 


Mean Corpuscular Hemoglobin


Concent 


 32 g/dL


(31-37) 


 





 


Red Cell Distribution Width


 


 18.4 %


(11.5-14.5)  H 


 





 


Platelet Count


 


 232 x10^3/uL


(140-400) 


 





 


Neutrophils (%) (Auto)  52 % (31-73)    


 


Lymphocytes (%) (Auto)  34 % (24-48)    


 


Monocytes (%) (Auto)  11 % (0-9)  H  


 


Eosinophils (%) (Auto)  2 % (0-3)    


 


Basophils (%) (Auto)  1 % (0-3)    


 


Neutrophils # (Auto)


 


 2.2 x10^3uL


(1.8-7.7) 


 





 


Lymphocytes # (Auto)


 


 1.5 x10^3/uL


(1.0-4.8) 


 





 


Monocytes # (Auto)


 


 0.5 x10^3/uL


(0.0-1.1) 


 





 


Eosinophils # (Auto)


 


 0.1 x10^3/uL


(0.0-0.7) 


 





 


Basophils # (Auto)


 


 0.1 x10^3/uL


(0.0-0.2) 


 





 


Sodium Level


 


 137 mmol/L


(136-145) 


 





 


Potassium Level


 


 3.6 mmol/L


(3.5-5.1) 


 





 


Chloride Level


 


 103 mmol/L


() 


 





 


Carbon Dioxide Level


 


 25 mmol/L


(21-32) 


 





 


Anion Gap  9 (6-14)    


 


Blood Urea Nitrogen


 


 9 mg/dL (7-20)


 


 





 


Creatinine


 


 0.7 mg/dL


(0.6-1.0) 


 





 


Estimated GFR


(Cockcroft-Gault) 


 111.6  


 


 





 


BUN/Creatinine Ratio  13 (6-20)    


 


Glucose Level


 


 98 mg/dL


(70-99) 


 





 


Calcium Level


 


 8.6 mg/dL


(8.5-10.1) 


 





 


Magnesium Level


 


 1.8 mg/dL


(1.8-2.4) 


 





 


Total Bilirubin


 


 0.3 mg/dL


(0.2-1.0) 


 





 


Aspartate Amino Transferase


(AST) 


 35 U/L (15-37)


 


 





 


Alanine Aminotransferase (ALT)


 


 32 U/L (14-59)


 


 





 


Alkaline Phosphatase


 


 57 U/L


() 


 





 


Troponin I Quantitative


 


 < 0.017 ng/mL


(0-0.055) 


 





 


Total Protein


 


 6.8 g/dL


(6.4-8.2) 


 





 


Albumin


 


 3.5 g/dL


(3.4-5.0) 


 





 


Albumin/Globulin Ratio  1.1 (1.0-1.7)    


 


Lipase


 


 132 U/L


() 


 





 


POC Urine HCG, Qualitative


 


 


 hcg negative


(Negative) 





 


D-Dimer (Bhavana)


 


 


 


 0.34 mg/L


(0.00-0.50)


 


Test


 10/10/21


13:00 


 


 





 


Troponin I Quantitative


 < 0.017 ng/mL


(0-0.055) 


 


 











Vital Signs:





                                   Vital Signs








  Date Time  Temp Pulse Resp B/P (MAP) Pulse Ox O2 Delivery O2 Flow Rate FiO2


 


10/10/21 13:15  67 18 144/79 (100) 100 Room Air  


 


10/10/21 09:44 97.8       











EKG:


EKG:


Sinus rhythm 69 bpm, no axis deviation, normal intervals, no T wave inversion, 

no ST elevation or ST depression





Radiology/Procedures:


Radiology/Procedures:


[]IMAGING REPORT





                                     Signed





PATIENT: MASOUD MOREIRA  ACCOUNT: [id number]     MRN#: [id number]


: 1980           LOCATION: ER              AGE: 41


SEX: F                    EXAM DT: 10/10/21         ACCESSION#: [id number]


STATUS: REG ER            ORD. PHYSICIAN: DREAD BRIAN DO


REASON: cp


PROCEDURE: PORTABLE CHEST 1V





EXAM: Chest, single view.





HISTORY: Chest pain.





COMPARISON: 2021





FINDINGS: A frontal view of the chest is obtained. There is no infiltrate, 

pleural effusion or pneumothorax. There is slight increased opacification of the

 lower thorax due to asymmetric overlying soft tissue. There is a stable cardiac

 silhouette. There is a hypoplastic or partially resected left first rib.





IMPRESSION: No acute pulmonary finding.





Electronically signed by: Moraima Barker MD (10/10/2021 11:36 AM) [id number]














DICTATED AND SIGNED BY:     MORAIMA BARKER MD


DATE:     10/10/21 1136





CC: LIAM HAIR MD; VOHS,DREAD M DO ~MTH0 0





Heart Score:


C/O Chest Pain:  Yes


HEART Score for Chest Pain:  








HEART Score for Chest Pain Response (Comments) Value


 


History Slighlty/Non-Suspicious 0


 


ECG Normal 0


 


Age < 45 0


 


Risk Factors                            1 or 2 Risk Factors 1


 


Troponin < Normal Limit 0


 


Total  1








Risk Factors:


Risk Factors:  DM, Current or recent (<one month) smoker, HTN, HLP, family hist

ory of CAD, obesity.


Risk Scores:


Score 0 - 3:  2.5% MACE over next 6 weeks - Discharge Home


Score 4 - 6:  20.3% MACE over next 6 weeks - Admit for Clinical Observation


Score 7 - 10:  72.7% MACE over next 6 weeks - Early Invasive Strategies





Course & Med Decision Making:


Course & Med Decision Making


Pertinent Labs and Imaging studies reviewed. (See chart for details)





Concern for multiple complaints emergency department including atypical chest 

pain, abnormal vaginal bleeding, anxiety, sleep deprivation and urinary 

complaints.  Patient rested comfortably and slept emergency department.  Patient

 with low heart score. Declined any narcotic analgesia or benzodiazepines.  

Unremarkable EKG with 2 - troponins and a D-dimer within normal limits.  

Urinalysis with hematuria consistent with patient being on her menses.  

Dissection is always considered, the low suspicion given well-appearing exam and

 mildly elevated blood pressure.  Will discharge home with strict ED return 

precautions were given for syncope, neurologic deficits, hemoptysis or typical 

chest pain. Encouraged urgent outpatient follow-up with PMD and outpatient 

cardiology for nonemergent evaluation.  Life-threatening processes were 

considered but are low suspicion at this time, given history, physical exam and 

ED workup. Pt was educated on all prescription medications and adverse effects. 

 All patient's questions were answered and pt was stable at time of discharge.





Life/limb-threatening differential includes but is not limited to, acute 

myocardial infarction, aortic dissection, congestive heart failure, esophageal 

injury including rupture, surgical abdomen, arrhythmia, cardiomyopathy, 

myocarditis, pericarditis, peptic ulcer disease, pneumomediastinum, pneumonia, 

pneumothorax, pulmonary embolus, unstable angina, rib fracture, contusion, 

pericardial tamponade or effusion, traumatic injury including mediastinal 

hemorrhage or hematoma, or pulmonary contusion.





I have spoken with the patient and/or caregivers.  I explained the patient's 

condition, diagnoses and treatment plan based on the information available to me

 at this time.  I have answered the patient and/or caregiver's questions and 

addressed any concerns.  The patient and/or caregivers have a good understanding

 of patient's diagnosis, condition and treatment plan as can be expected at this

 point.  Vital signs have been stable.  Patient's condition is stable and 

appropriate for discharge from the emergency department. 





Patient will pursue further outpatient evaluation with primary care physician or

 other designated or consulting physician as outlined in the discharge 

instructions.  The patient and/or caregivers are agreeable to this plan of care 

and follow-up instructions have been explained in detail.  The patient and/or 

caregivers have received these instructions in written form and have expressed 

an understanding of the discharge instructions.  The patient and/or caregivers 

are aware that any significant change of condition or worsening of symptoms sh

ould prompt immediate return to this or the closest emergency department or call

 to 911.





Shara Disclaimer:


Dragon Disclaimer:


This electronic medical record was generated, in whole or in part, using a voice

 recognition dictation system.





Departure


Departure:


Impression:  


   Primary Impression:  


   Atypical chest pain


   Additional Impression:  


   Stress at home


Disposition:  01 HOME / SELF CARE / HOMELESS


Condition:  STABLE


Referrals:  


LIAM HAIR MD (PCP)


Follow up with your pcp in 1-2 days or


[facility]


[phone] 


OR


[facility]


[phone]


Patient Instructions:  Chest Pain (Nonspecific)





Additional Instructions:  


FOLLOW UP WITH CARDIOLOGY:  FOR DEFINITIVE MANAGEMENT of atypical chest pain


[facility]


[address]


[place] [address]


[phone]





OR 





[facility]


[address]


[place] [address]





FOLLOW UP WITH PSYCHIATRY: for anxiety and stress management/counseling


[facility]


[address], [address]


[place] [address] [phone]





[facility]


[address] [place] [address]


[phone]





[facility]


[address], [address]


[place] [address]


[phone]





EMERGENCY DEPARTMENT GENERAL DISCHARGE INSTRUCTIONS





Thank you for coming to [facility] (ED) today and trusting us

 with you 


care.  We trust that you had a positivie experience in our Emergency Department.

  If you 


wish to speak to the department management, you may call the director at 

[phone].





YOUR FOLLOW UP INSTRUCTIONS ARE AS FOLLOWS:





1.  Do you have a private Doctor?  If you do not have a private doctor, please 

ask for a 


resource list of physicians or clinics that may be able to assist you with 

follow up care.





2.  The Emergency Physician has interpreted your x-rays.  The X-Ray specialist 

will also 


review them.  If there is a change in the findings, you will be notified in 48 

hours when at 


all possible.





3.  A lab test or culture has been done, your results will be reviewed and you 

will be 


notified if you need a change in treatment.





ADDITIONAL INSTRUCTIONS AND INFORMATION:





1.  Your care today has been supervised by a physician who is specially trained 

in emergency 


care.  Many problems require more than one evaluation for a complete diagnosis 

and 


treatment.  We recommend that you schedule your follow up appointment as 

recommended to 


ensure complete treatment of you illness or injury.  If you are unable to obtain

 follow up 


care and continue to have a problem, or if your condition worsens, we recommend 

that you 


return to the ED.





2.  We are not able to safely determine your condition over the phone nor are we

 able to 


give sound medical advice over the phone.  For these safety reasons, if you call

 for medical 


advice we will ask you to come to the ED for further evaluation.





3.  If you have any questions regarding these discharge instructions please call

 the ED at 


[phone].





SAFETY INFORMATION:





In the interest of safety, wellness, and injury prevention; we encourage you to 

wear your 


sealbelt, if you smoke; quite smoking, and we encourage family to use a 

protective helmet 


for bicycling and other sporting events that present an increased risk for head 

injury.





IF YOUR SYMPTOMS WORSEN OR NEW SYMPTOMS DEVELOP, OR YOU HAVE CONCERNS ABOUT YOUR

 CONDITION; 


OR IF YOUR CONDITION WORSENS WHILE YOU ARE WAITING FOR YOUR FOLLOW UP APPO

INTMENT; EITHER 


CONTACT YOUR PRIMARY CARE DOCTOR, THE PHYSICIAN WHOSE NAME AND NUMBER YOU WERE 

GIVEN, OR 


RETURN TO THE ED IMMEDIATELY.


Scripts


Hydroxyzine Hcl (HYDROXYZINE HCL) 25 Mg Tablet


1 TAB PO PRN BID PRN for ANXIETY / AGITATION, #20 TAB 0 Refills


   Be careful as this medication may make you mildly tired. I


   recommend not driving on this medication or operating heavy


   machinery.


   Prov: DREAD BRIAN DO         10/10/21











VODREAD ZUÑIGA DO               Oct 10, 2021 15:04

## 2021-12-14 ENCOUNTER — HOSPITAL ENCOUNTER (EMERGENCY)
Dept: HOSPITAL 63 - ER | Age: 41
Discharge: HOME | End: 2021-12-14
Payer: COMMERCIAL

## 2021-12-14 VITALS — WEIGHT: 209.44 LBS | BODY MASS INDEX: 28.37 KG/M2 | HEIGHT: 72 IN

## 2021-12-14 VITALS — SYSTOLIC BLOOD PRESSURE: 137 MMHG | DIASTOLIC BLOOD PRESSURE: 73 MMHG

## 2021-12-14 DIAGNOSIS — Z88.5: ICD-10-CM

## 2021-12-14 DIAGNOSIS — E03.9: ICD-10-CM

## 2021-12-14 DIAGNOSIS — Z88.0: ICD-10-CM

## 2021-12-14 DIAGNOSIS — F41.9: ICD-10-CM

## 2021-12-14 DIAGNOSIS — R07.89: Primary | ICD-10-CM

## 2021-12-14 LAB
ALBUMIN SERPL-MCNC: 4 G/DL (ref 3.4–5)
ALBUMIN/GLOB SERPL: 1 {RATIO} (ref 1–1.7)
ALP SERPL-CCNC: 81 U/L (ref 46–116)
ALT SERPL-CCNC: 56 U/L (ref 14–59)
ANION GAP SERPL CALC-SCNC: 15 MMOL/L (ref 6–14)
ANISOCYTOSIS BLD QL SMEAR: SLIGHT
AST SERPL-CCNC: 73 U/L (ref 15–37)
BASOPHILS # BLD AUTO: 0.1 X10^3/UL (ref 0–0.2)
BASOPHILS NFR BLD: 2 % (ref 0–3)
BILIRUB SERPL-MCNC: 0.5 MG/DL (ref 0.2–1)
BUN/CREAT SERPL: 10 (ref 6–20)
CA-I SERPL ISE-MCNC: 8 MG/DL (ref 7–20)
CALCIUM SERPL-MCNC: 8.6 MG/DL (ref 8.5–10.1)
CHLORIDE SERPL-SCNC: 98 MMOL/L (ref 98–107)
CO2 SERPL-SCNC: 23 MMOL/L (ref 21–32)
CREAT SERPL-MCNC: 0.8 MG/DL (ref 0.6–1)
EOSINOPHIL NFR BLD: 0 X10^3/UL (ref 0–0.7)
EOSINOPHIL NFR BLD: 1 % (ref 0–3)
ERYTHROCYTE [DISTWIDTH] IN BLOOD BY AUTOMATED COUNT: 20.3 % (ref 11.5–14.5)
GFR SERPLBLD BASED ON 1.73 SQ M-ARVRAT: 95.6 ML/MIN
GLOBULIN SER-MCNC: 4 G/DL (ref 2.2–3.8)
GLUCOSE SERPL-MCNC: 94 MG/DL (ref 70–99)
HCT VFR BLD CALC: 31.2 % (ref 36–47)
HGB BLD-MCNC: 10 G/DL (ref 12–15.5)
HYPOCHROMIA BLD QL SMEAR: SLIGHT
LYMPHOCYTES # BLD: 1.3 X10^3/UL (ref 1–4.8)
LYMPHOCYTES NFR BLD AUTO: 36 % (ref 24–48)
MCH RBC QN AUTO: 26 PG (ref 25–35)
MCHC RBC AUTO-ENTMCNC: 32 G/DL (ref 31–37)
MCV RBC AUTO: 82 FL (ref 79–100)
MONO #: 0.4 X10^3/UL (ref 0–1.1)
MONOCYTES NFR BLD: 10 % (ref 0–9)
NEUT #: 1.9 X10^3UL (ref 1.8–7.7)
NEUTROPHILS NFR BLD AUTO: 52 % (ref 31–73)
PLATELET # BLD AUTO: 256 X10^3/UL (ref 140–400)
PLATELET # BLD EST: ADEQUATE 10*3/UL
POTASSIUM SERPL-SCNC: 3.7 MMOL/L (ref 3.5–5.1)
PROT SERPL-MCNC: 8 G/DL (ref 6.4–8.2)
RBC # BLD AUTO: 3.82 X10^6/UL (ref 3.5–5.4)
SODIUM SERPL-SCNC: 136 MMOL/L (ref 136–145)
WBC # BLD AUTO: 3.8 X10^3/UL (ref 4–11)

## 2021-12-14 PROCEDURE — 71045 X-RAY EXAM CHEST 1 VIEW: CPT

## 2021-12-14 PROCEDURE — 80053 COMPREHEN METABOLIC PANEL: CPT

## 2021-12-14 PROCEDURE — 36415 COLL VENOUS BLD VENIPUNCTURE: CPT

## 2021-12-14 PROCEDURE — 93005 ELECTROCARDIOGRAM TRACING: CPT

## 2021-12-14 PROCEDURE — 83690 ASSAY OF LIPASE: CPT

## 2021-12-14 PROCEDURE — 85025 COMPLETE CBC W/AUTO DIFF WBC: CPT

## 2021-12-14 PROCEDURE — 96375 TX/PRO/DX INJ NEW DRUG ADDON: CPT

## 2021-12-14 PROCEDURE — 99285 EMERGENCY DEPT VISIT HI MDM: CPT

## 2021-12-14 PROCEDURE — 84484 ASSAY OF TROPONIN QUANT: CPT

## 2021-12-14 PROCEDURE — 96374 THER/PROPH/DIAG INJ IV PUSH: CPT

## 2021-12-14 NOTE — EKG
[facility] [address], [place] [address]

Test Date:    2021               Test Time:    17:47:39

Pat Name:     MASOUD MOREIRA            Department:   

Patient ID:   [id number]           Room:          

Gender:       F                        Technician:   MADHAV

:          1980               Requested By: ZAIN DIAZ

Order Number: [id number]            Reading MD:   Paul Cherry

                                 Measurements

Intervals                              Axis          

Rate:         78                       P:            90

AK:           148                      QRS:          73

QRSD:         84                       T:            66

QT:           370                                    

QTc:          425                                    

                           Interpretive Statements

SINUS RHYTHM

Electronically Signed On 2021 16:29:20 CST by Paul Cherry

## 2021-12-14 NOTE — RAD
Single view chest dated 12/14/2021 6:03 PM:



COMPARISON: 10/10/2021



Clinical Indication: Chest pain.



Findings:



Single upright portable exam of the chest was performed. Heart size and mediastinal contours are with
in normal limits. Lungs are clear. No consolidation or pleural effusion. No pneumothorax.



IMPRESSION:



No acute radiographic abnormality.



Electronically signed by: Shukri Valdez MD (12/14/2021 6:03 PM) SILVER

## 2021-12-14 NOTE — PHYS DOC
Past History


Past Medical History:  Anxiety, Hypothyroid


Additional Past Medical Histor:  brain aneursym


Past Surgical History:  , Tubal ligation, Other


Additional Past Surgical Histo:  UMBILICAL HERNIA


Smoking:  Non-smoker


Alcohol Use:  None


Drug Use:  None





General Adult


EDM:


Chief Complaint:  CHEST PAIN





HPI:


HPI:





Patient is a 41 year old female well-known to the department who presents with 

central chest pressure, dizziness, diaphoresis.  Patient has been seen in the 

department multiple times prior for similar symptoms.  She states this time her 

chest pain is worse.  She rates it 89/10 radiating to her right lower 

chest/upper abdomen.  It has been constant for the past 3 hours.  She reports 

associated dizziness, diaphoresis.  Patient denies weakness, vision changes, 

visual field deficits, NVD.





Review of Systems:


Review of Systems:


Constitutional:  Denies fever or chills 


Eyes:  Denies change in visual acuity or visual field deficits


HENT:  Denies nasal congestion or sore throat 


Respiratory:  See HPI


Cardiovascular:  See HPI


GI:  See HPI


: Denies dysuria or hematuria


Musculoskeletal:  Denies back pain or joint pain 


Integument:  Denies rash or other skin lesions


Neurologic:  See HPI


Psychiatric:  See HPI





Allergies:


Allergies:





Allergies








Coded Allergies Type Severity Reaction Last Updated Verified


 


  Penicillins Allergy Unknown  17 Yes


 


  morphine Allergy Unknown  17 Yes











Physical Exam:


PE:





Constitutional: Well developed, well nourished, non-toxic appearance, patient is

tearful.


Eyes: PERRLA, EOMI, conjunctiva normal, no discharge.  


Neck: Normal range of motion, no tenderness, supple, no stridor. 


Cardiovascular: Heart rate regular rhythm, no murmur.


Lungs & Thorax: Bilateral breath sounds clear to auscultation.


Abdomen: Bowel sounds normal, soft, no tenderness, no masses, no pulsatile 

masses. 


Skin: Warm, dry, no erythema, no rash.


Neurologic: Alert and oriented x4, no focal deficits noted. 


Psychologic: Affect anxious and tearful, fair judgment, poor insight, mood "it's

a lot going on."





Current Patient Data:


Labs:





Laboratory Tests








Test


 21


17:51 21


18:12


 


White Blood Count


 3.8 x10^3/uL


(4.0-11.0) 





 


Red Blood Count


 3.82 x10^6/uL


(3.50-5.40) 





 


Hemoglobin


 10.0 g/dL


(12.0-15.5) 





 


Hematocrit


 31.2 %


(36.0-47.0) 





 


Mean Corpuscular Volume 82 fL ()  


 


Mean Corpuscular Hemoglobin 26 pg (25-35)  


 


Mean Corpuscular Hemoglobin


Concent 32 g/dL


(31-37) 





 


Red Cell Distribution Width


 20.3 %


(11.5-14.5) 





 


Platelet Count


 256 x10^3/uL


(140-400) 





 


Neutrophils (%) (Auto) 52 % (31-73)  


 


Lymphocytes (%) (Auto) 36 % (24-48)  


 


Monocytes (%) (Auto) 10 % (0-9)  


 


Eosinophils (%) (Auto) 1 % (0-3)  


 


Basophils (%) (Auto) 2 % (0-3)  


 


Neutrophils # (Auto)


 1.9 x10^3uL


(1.8-7.7) 





 


Lymphocytes # (Auto)


 1.3 x10^3/uL


(1.0-4.8) 





 


Monocytes # (Auto)


 0.4 x10^3/uL


(0.0-1.1) 





 


Eosinophils # (Auto)


 0.0 x10^3/uL


(0.0-0.7) 





 


Basophils # (Auto)


 0.1 x10^3/uL


(0.0-0.2) 





 


Sodium Level


 136 mmol/L


(136-145) 





 


Potassium Level


 3.7 mmol/L


(3.5-5.1) 





 


Chloride Level


 98 mmol/L


() 





 


Carbon Dioxide Level


 23 mmol/L


(21-32) 





 


Anion Gap 15 (6-14)  


 


Blood Urea Nitrogen 8 mg/dL (7-20)  


 


Creatinine


 0.8 mg/dL


(0.6-1.0) 





 


Estimated GFR


(Cockcroft-Gault) 95.6 


 





 


BUN/Creatinine Ratio 10 (6-20)  


 


Glucose Level


 94 mg/dL


(70-99) 





 


Calcium Level


 8.6 mg/dL


(8.5-10.1) 





 


Total Bilirubin


 0.5 mg/dL


(0.2-1.0) 





 


Aspartate Amino Transf


(AST/SGOT) 73 U/L (15-37) 


 





 


Alanine Aminotransferase


(ALT/SGPT) 56 U/L (14-59) 


 





 


Alkaline Phosphatase


 81 U/L


() 





 


Troponin I High Sensitivity 6 ng/L (4-50)  


 


Total Protein


 8.0 g/dL


(6.4-8.2) 





 


Albumin


 4.0 g/dL


(3.4-5.0) 





 


Albumin/Globulin Ratio 1.0 (1.0-1.7)  


 


Lipase


 


 110 U/L


()








Vital Signs:





                          VS - Last 72 Hours, by Label








  Date Time  Temp Pulse Resp B/P (MAP) Pulse Ox O2 Delivery O2 Flow Rate FiO2


 


21 19:17  76 15 137/73 (94) 100   


 


21 18:43  77 20 139/53 (81) 100   


 


21 17:40 98.3 80 18 156/88 (110) 100   











EKG:


EKG:


EKG Interpreted by Dr. Patino at 1748: Regular rate and rhythm 78 bpm with no 

ectopic beats.   ms/QTc 425 ms.  No concerning ST-T wave changes.





Radiology/Procedures:


Radiology/Procedures:


PROCEDURE: PORTABLE CHEST 1V





Single view chest dated 2021 6:03 PM:





COMPARISON: 10/10/2021





Clinical Indication: Chest pain.





Findings:





Single upright portable exam of the chest was performed. Heart size and 

mediastinal contours are within normal limits. Lungs are clear. No consolidation

or pleural effusion. No pneumothorax.





IMPRESSION:





No acute radiographic abnormality.





Electronically signed by: Shukri Valdez MD (2021 6:03 PM) [facility]





Heart Score:


C/O Chest Pain:  Yes


HEART Score for Chest Pain:  








HEART Score for Chest Pain Response (Comments) Value


 


History Slighlty/Non-Suspicious 0


 


ECG Normal 0


 


Age < 45 0


 


Risk Factors No Risk Factors 0


 


Troponin < Normal Limit 0


 


Total  0








Risk Factors:


Risk Factors:  none


Risk Scores:


Score 0 - 3:  2.5% MACE over next 6 weeks - Discharge Home


Score 4 - 6:  20.3% MACE over next 6 weeks - Admit for Clinical Observation


Score 7 - 10:  72.7% MACE over next 6 weeks - Early Invasive Strategies





Course & Med Decision Making:


Course & Med Decision Making


Pertinent Labs and Imaging studies reviewed. (See chart for details)





Patient who is well-known to the department presents today with very similar 

complaints as prior visits related to chest pain and anxiety.  Work-up will 

include EKG, chest x-ray, troponin, lab work.





Initially, patient denied nausea and vomiting, but states she feels nauseated 

department.  Zofran will be administered.





Work-up today is largely reassuring.  After Zofran and ketorolac administration,

patient states her symptoms have all resolved.  She declines hydroxyzine in the 

department, but is agreeable to a prescription for return of symptoms.  Patient 

was given return precautions.  She understands and is agreeable to discharge 

plan.





Dragon Disclaimer:


Dragon Disclaimer:


This electronic medical record was generated, in whole or in part, using a voice

recognition dictation system.





Departure


Departure:


Impression:  


   Primary Impression:  


   Non-cardiac chest pain


   Additional Impression:  


   Severe anxiety


Disposition:   HOME / SELF CARE / HOMELESS


Condition:  STABLE


Referrals:  


LIAM HAIR MD (PCP)


Patient Instructions:  Anxiety and Panic Attacks, Easy-to-Read, Chest Pain 

(Nonspecific), Easy-to-Read


Scripts


Hydroxyzine Hcl (HYDROXYZINE HCL) 25 Mg Tablet


1 TAB PO PRN BID PRN for ANXIETY / AGITATION, #20 TAB


   Prov: ZAIN DIAZ         21











ZAIN DIAZ              Dec 14, 2021 17:46

## 2022-01-05 ENCOUNTER — HOSPITAL ENCOUNTER (EMERGENCY)
Dept: HOSPITAL 63 - ER | Age: 42
LOS: 1 days | Discharge: HOME | End: 2022-01-06
Payer: COMMERCIAL

## 2022-01-05 VITALS — BODY MASS INDEX: 29.95 KG/M2 | HEIGHT: 72 IN | WEIGHT: 221.12 LBS

## 2022-01-05 DIAGNOSIS — R91.1: ICD-10-CM

## 2022-01-05 DIAGNOSIS — Z88.0: ICD-10-CM

## 2022-01-05 DIAGNOSIS — Z88.5: ICD-10-CM

## 2022-01-05 DIAGNOSIS — E03.9: ICD-10-CM

## 2022-01-05 DIAGNOSIS — D64.9: ICD-10-CM

## 2022-01-05 DIAGNOSIS — F41.9: ICD-10-CM

## 2022-01-05 DIAGNOSIS — R07.81: Primary | ICD-10-CM

## 2022-01-05 PROCEDURE — 84443 ASSAY THYROID STIM HORMONE: CPT

## 2022-01-05 PROCEDURE — 80048 BASIC METABOLIC PNL TOTAL CA: CPT

## 2022-01-05 PROCEDURE — 80076 HEPATIC FUNCTION PANEL: CPT

## 2022-01-05 PROCEDURE — 99285 EMERGENCY DEPT VISIT HI MDM: CPT

## 2022-01-05 PROCEDURE — 80061 LIPID PANEL: CPT

## 2022-01-05 PROCEDURE — 93005 ELECTROCARDIOGRAM TRACING: CPT

## 2022-01-05 PROCEDURE — 71045 X-RAY EXAM CHEST 1 VIEW: CPT

## 2022-01-05 PROCEDURE — 83880 ASSAY OF NATRIURETIC PEPTIDE: CPT

## 2022-01-05 PROCEDURE — 83690 ASSAY OF LIPASE: CPT

## 2022-01-05 PROCEDURE — 85007 BL SMEAR W/DIFF WBC COUNT: CPT

## 2022-01-05 PROCEDURE — 83735 ASSAY OF MAGNESIUM: CPT

## 2022-01-05 PROCEDURE — 85025 COMPLETE CBC W/AUTO DIFF WBC: CPT

## 2022-01-05 PROCEDURE — 71275 CT ANGIOGRAPHY CHEST: CPT

## 2022-01-05 PROCEDURE — 81001 URINALYSIS AUTO W/SCOPE: CPT

## 2022-01-05 PROCEDURE — 70450 CT HEAD/BRAIN W/O DYE: CPT

## 2022-01-05 PROCEDURE — 85610 PROTHROMBIN TIME: CPT

## 2022-01-05 PROCEDURE — 72125 CT NECK SPINE W/O DYE: CPT

## 2022-01-05 PROCEDURE — 36415 COLL VENOUS BLD VENIPUNCTURE: CPT

## 2022-01-05 PROCEDURE — 84484 ASSAY OF TROPONIN QUANT: CPT

## 2022-01-05 PROCEDURE — 85379 FIBRIN DEGRADATION QUANT: CPT

## 2022-01-05 PROCEDURE — 80307 DRUG TEST PRSMV CHEM ANLYZR: CPT

## 2022-01-05 PROCEDURE — 85730 THROMBOPLASTIN TIME PARTIAL: CPT

## 2022-01-05 PROCEDURE — 82550 ASSAY OF CK (CPK): CPT

## 2022-01-05 PROCEDURE — 96374 THER/PROPH/DIAG INJ IV PUSH: CPT

## 2022-01-05 PROCEDURE — 81025 URINE PREGNANCY TEST: CPT

## 2022-01-05 NOTE — PHYS DOC
Past History


Past Medical History:  Anxiety, Hypothyroid


Additional Past Medical Histor:  Thyroid ablation


Past Surgical History:  , Tubal ligation, Other


Additional Past Surgical Histo:  UMBILICAL HERNIA


Smoking:  Non-smoker


Alcohol Use:  Rarely


Drug Use:  None





General Adult


HPI:


HPI:


".. I still got chest pain..  it never stopped since the other day... I even 

followed up with my doctor.. Joseph... felt it was pleuritic chest pain..





Patient is a 41 year old female who presents with above hx and complaints of 

chest pain.  Chest pain is central and radiates to Lt. shoulder with movement 

and deep breaths.  Patient seen 1214 for similar complaint has seen Dr. Ruiz 

and place on Naproxin with minimal improvement.  Patient does have a history of 

positive Covid infection diagnosed 10 / 21.  Has not gotten Covid vaccination.  

Has not gotten flu vaccination.  Does have a history of anxiety, hypothyroid 

post thyroid ablation with oral radioactive iodine, brain aneurysm, has had C-

section and tubal ligation.  Patient denies any history of DVTs or pulmonary 

embolisms.  Patient has increased anxiety, episodes of dizziness and diaphoresis

with episodes of chest pain.





Review of Systems:


Review of Systems:


Constitutional:  Denies fever or chills 


Eyes:  Denies change in visual acuity 


HENT:  Denies nasal congestion or sore throat 


Respiratory:  Denies cough or shortness of breath 


Cardiovascular: Complains of chest pain 


GI:  Denies abdominal pain, nausea, vomiting, bloody stools or diarrhea 


: Denies dysuria 


Musculoskeletal:  Denies back pain or joint pain 


Integument:  Denies rash 


Neurologic:  Denies headache, focal weakness or sensory changes 


Endocrine:  Denies polyuria or polydipsia 


Lymphatic:  Denies swollen glands 


Psychiatric:  Denies depression or anxiety





Family History:


Family History:


Noncontributory





Current Medications:


Current Meds:


See nursing for home meds





Allergies:


Allergies:





Allergies








Coded Allergies Type Severity Reaction Last Updated Verified


 


  Penicillins Allergy Unknown  17 Yes


 


  morphine Allergy Unknown  17 Yes











Physical Exam:


PE:





Constitutional: Moderate acute distress, non-toxic appearance. []


HENT: Normocephalic, atraumatic, bilateral external ears normal, oropharynx 

moist, no oral exudates, nose normal. []


Eyes: PERRLA, EOMI, conjunctiva normal, no discharge. [] 


Neck: Normal range of motion, no tenderness, supple, no stridor. [] 


Cardiovascular:Heart rate regular rhythm, no murmur []


Lungs & Thorax:  Bilateral breath sounds equal apex on auscultation []


Abdomen: Bowel sounds normal, soft, no tenderness, no masses, no pulsatile 

masses. [] 


Skin: Warm, dry, no erythema, no rash. [] 


Back: No tenderness, no CVA tenderness. [] 


Extremities: No tenderness, no cyanosis, no clubbing, ROM intact, no edema.  No 

cording appreciated


Neurologic: Alert and oriented X 3, normal motor function, normal sensory 

function, no focal deficits noted. []


Psychologic: Affect normal, judgement normal, mood normal. []





EKG:


EKG:


My interpretation of EKG shows a sinus rhythm with no acute findings of STEMI.  

Rate is 66.  Time of EKG is 2320 hrs. []


My interpretation EKG #2 shows a sinus rhythm at 63 bpm.  No acute morphology.  

No acute interval change as compared to prior EKG.  Time of this EKG is 0132 

hrs.





Radiology/Procedures:


Radiology/Procedures:





                               [facility] [address], [place] [address]


                                 [phone]


                                        


                                 IMAGING REPORT





                                     Signed





PATIENT: MASOUD MOREIRA  ACCOUNT: [id number]     MRN#: [id number]


: 1980           LOCATION: ER              AGE: 41


SEX: F                    EXAM DT: 22         ACCESSION#: [id number]


STATUS: REG ER            ORD. PHYSICIAN: CHARLES CHANG MD


REASON: Chest pain Omni 350 100cc


PROCEDURE: CT ANGIOGRAPHY CHEST





EXAM: CT chest with contrast - pulmonary embolus protocol





CLINICAL HISTORY:  Chest pain





COMPARISON: None.





TECHNIQUE: CT of the chest following the administration of  intravenous contrast

 during the pulmonary arterial phase. Axial, coronal and sagittal reformatted 

images were generated including MIP images.





---PQRS compliance statement - One or more of the following individualized dose 

reduction techniques were utilized for this study:


1.  Automated exposure control


2.  Adjustment of the mA and/or kV according to patient size


3.  Use of iterative reconstruction technique---





FINDINGS:


CHEST: 


Diagnostic quality: Adequate. 


Pulmonary emboli:  None seen


Right heart strain: None


Pulmonary arteries: Normal in caliber.





Heart is not enlarged. No pericardial effusion. Mild dilation ascending aorta 

measuring up to 3.9 cm.





12 mm groundglass nodule in the lingula. Linear opacities in lower lobes likely 

scarring/atelectasis.





No mediastinal or hilar lymphadenopathy. No axillary lymphadenopathy.





Visualized Upper abdomen:  Unremarkable





Bones: No aggressive osseous lesion. Evaluation limited given MIP 

reconstructions. Diminutive appearance of the left first rib, chronic.





IMPRESSION:


1.  No evidence for acute pulmonary embolus.


2.  12 mm groundglass nodule in the lingula. Per Fleischner Society guidelines 

for incidentally found solitary ground-glass nodule, follow-up CT is recommended

 in 6-12 months, then every 2 years until 5 years.


3.  3.9 cm dilation of the ascending aorta.





Electronically signed by: Sukumar Koenig MD (2022 1:12 AM) [facility]














DICTATED AND SIGNED BY:     SUKUMAR KOENIG MD


DATE:     22





CC: CHARLES CHANG MD; LIAM RUIZ MD ~MTH0 0


Signed





PATIENT: MASOUD MOREIRA  ACCOUNT: [id number]     MRN#: [id number]


: 1980           LOCATION: ER              AGE: 41


SEX: F                    EXAM DT: 22         ACCESSION#: [id number]


STATUS: REG ER            ORD. PHYSICIAN: CHARLES CHANG MD


REASON: HA, Dizzy - hx brain aneursym, thyroid ca- s/p surgery


PROCEDURE: CT HEAD AND CERVICAL SPINE WO





EXAM: CT HEAD WITHOUT IV CONTRAST





CLINICAL HISTORY: Reason: HA, Dizzy - hx brain aneursym, thyroid ca- s/p surgery

 / Spl. Instructions:  / History: 





COMPARISON: None.





TECHNIQUE:


Routine CT of the head without contrast. Soft tissues and bone windows were 

reviewed.





PQRS compliance statement - One or more of the following individualized dose 

reduction techniques were utilized for this study:


1.  Automated exposure control


2.  Adjustment of the mA and/or kV according to patient size


3.  Use of iterative reconstruction technique





FINDINGS:  


There is no evidence of hemorrhage, mass or extra-axial fluid collection.





Grey-white differentiation is maintained with no evidence of edema. 





There is no mass effect or shift of the intracranial structures.





The ventricles, basilar cisterns and cortical sulci are normal in size and 

configuration for the patients stated age.





The cerebellum and brainstem are unremarkable.  





The calvarium demonstrates no evidence of fracture or focal lesion.





There is normal aeration of the visualized paranasal sinuses and mastoid air 

cells.





The visualized portions of the orbits are normal.











IMPRESSION:


No evidence for acute intracranial process.








___________________________________


EXAM: CT CERVICAL SPINE WITHOUT IV CONTRAST





CLINICAL HISTORY: Reason: HA, Dizzy - hx brain aneursym, thyroid ca- s/p surgery

 / Spl. Instructions:  / History: 





COMPARISON: None available.





TECHNIQUE: Helical CT of the cervical spine was performed. Axial, coronal and 

sagittal reformatted images were also performed.





PQRS compliance statement - One or more of the following individualized dose 

reduction techniques were utilized for this study:


1.  Automated exposure control


2.  Adjustment of the mA and/or kV according to patient size


3.  Use of iterative reconstruction technique





FINDINGS: 








Vertebral body heights are preserved.





No spondylolisthesis.





Intervertebral disc heights are preserved.











IMPRESSION:


No acute cervical spine fracture or subluxation.





Electronically signed by: Sukumar Koenig MD (2022 1:06 AM) [facility]














DICTATED AND SIGNED BY:     SUKUMAR KOENIG MD


DATE:     22 0100





CC: CHARLES CHANG MD; LIAM RUIZ MD ~MTH0 0


[][facility] [address], [place] [address]


                                 [phone]


                                        


                                 IMAGING REPORT





                                     Signed





PATIENT: MASOUD MOREIRA  ACCOUNT: [id number]     MRN#: [id number]


: 1980           LOCATION: ER              AGE: 41


SEX: F                    EXAM DT: 22         ACCESSION#: [id number]


STATUS: REG ER            ORD. PHYSICIAN: CHARLES CHANG MD


REASON: cp


PROCEDURE: PORTABLE CHEST 1V





EXAM: AP View of the chest





DATE: 2022 11:10 PM





INDICATION: Reason: cp / Spl. Instructions:  / History:  Chest pain





COMPARISON: 2021





FINDINGS:








The heart is not enlarged.





Mediastinal and hilar contours are normal.





No focal parenchymal airspace opacity.





No pleural effusion or pneumothorax.











IMPRESSION:


1.  No radiographic evidence for acute cardiopulmonary process.





Electronically signed by: Sukumar Koenig MD (2022 12:46 AM) [facility]














DICTATED AND SIGNED BY:     SUKUMAR KOENIG MD


DATE:     22 0046





CC: CHARLES CHANG MD; LIAM RUIZ MD ~MTH0 0





Heart Score:


C/O Chest Pain:  Yes


HEART Score for Chest Pain:  








HEART Score for Chest Pain Response (Comments) Value


 


History Slighlty/Non-Suspicious 0


 


ECG Normal 0


 


Age < 45 0


 


Risk Factors                            1 or 2 Risk Factors 1


 


Troponin < Normal Limit 0


 


Total  1








Risk Factors:


Risk Factors:  DM, Current or recent (<one month) smoker, HTN, HLP, family 

history of CAD, obesity.


Risk Scores:


Score 0 - 3:  2.5% MACE over next 6 weeks - Discharge Home


Score 4 - 6:  20.3% MACE over next 6 weeks - Admit for Clinical Observation


Score 7 - 10:  72.7% MACE over next 6 weeks - Early Invasive Strategies





Course & Med Decision Making:


Course & Med Decision Making


Pertinent Labs and Imaging studies reviewed. (See chart for details)





Patient refusing swabs for flu and COVID.  Pt. to follow up with primary and get

 an out pt stress testing.  Review ED work up and pending labs with primary.   I

nvestigate other causes of chest pain.   Follow up CT in 6 months.  Suspect 

viral syndrome- or post viral syndrome.  MV OTC.  








Impression:





1. Chest Pain-pleuritic/ Chest Wall Pain


2. Anemia  Hgb= 10.1


3. Hx Hypothyroid- post ablation


4. 12 mm Nodule in Lingula


5.  Anxiety





[]





Dragon Disclaimer:


Dragon Disclaimer:


This electronic medical record was generated, in whole or in part, using a voice

 recognition dictation system.





Departure


Departure:


Referrals:  


LIAM RUIZ MD (PCP)





Dragon Disclaimer


This chart was dictated in whole or in part using Voice Recognition software in 

a busy, high-work load, and often noisy Emergency Department environment.  It 

may contain unintended and wholly unrecognized errors or omissions.











CHARLES CHANG MD            2022 23:01

## 2022-01-05 NOTE — EKG
[facility] [address], [place] [address]

Test Date:    2022               Test Time:    23:20:39

Pat Name:     MASOUD MOREIRA            Department:   

Patient ID:   [id number]           Room:          

Gender:       F                        Technician:   

:          1980               Requested By: CHARLES CHANG

Order Number: [id number]            Reading MD:   Sergio Blount

                                 Measurements

Intervals                              Axis          

Rate:         66                       P:            90

HI:           168                      QRS:          66

QRSD:         86                       T:            46

QT:           384                                    

QTc:          404                                    

                           Interpretive Statements

SINUS RHYTHM

NORMAL ECG

RI6.02

Compared to ECG 2021 17:47:39

No significant changes

Electronically Signed On 2022 14:26:11 CST by Sergio Blount

## 2022-01-06 VITALS — SYSTOLIC BLOOD PRESSURE: 140 MMHG | DIASTOLIC BLOOD PRESSURE: 74 MMHG

## 2022-01-06 LAB
% LYMPHS: 28 % (ref 24–48)
% MONOS: 6 % (ref 0–10)
% SEGS: 64 % (ref 35–66)
ALBUMIN SERPL-MCNC: 4.1 G/DL (ref 3.4–5)
ALP SERPL-CCNC: 63 U/L (ref 46–116)
ALT SERPL-CCNC: 31 U/L (ref 14–59)
AMPHETAMINE/METHAMPHETAMINE: (no result)
ANION GAP SERPL CALC-SCNC: 11 MMOL/L (ref 6–14)
ANISOCYTOSIS BLD QL SMEAR: SLIGHT
APTT PPP: YELLOW S
AST SERPL-CCNC: 50 U/L (ref 15–37)
BACTERIA #/AREA URNS HPF: 0 /HPF
BARBITURATES UR-MCNC: (no result) UG/ML
BASOPHILS # BLD AUTO: 0.1 X10^3/UL (ref 0–0.2)
BASOPHILS NFR BLD: 2 % (ref 0–3)
BENZODIAZ UR-MCNC: (no result) UG/L
BILIRUB DIRECT SERPL-MCNC: 0.2 MG/DL (ref 0–0.2)
BILIRUB SERPL-MCNC: 0.5 MG/DL (ref 0.2–1)
BILIRUB UR QL STRIP: (no result)
CA-I SERPL ISE-MCNC: 5 MG/DL (ref 7–20)
CALCIUM SERPL-MCNC: 8.9 MG/DL (ref 8.5–10.1)
CANNABINOIDS UR-MCNC: (no result) UG/L
CHLORIDE SERPL-SCNC: 100 MMOL/L (ref 98–107)
CHOLEST/HDLC SERPL: 1 {RATIO}
CO2 SERPL-SCNC: 26 MMOL/L (ref 21–32)
COCAINE UR-MCNC: (no result) NG/ML
CREAT SERPL-MCNC: 0.8 MG/DL (ref 0.6–1)
EOSINOPHIL NFR BLD AUTO: 2 % (ref 0–5)
EOSINOPHIL NFR BLD: 0 X10^3/UL (ref 0–0.7)
EOSINOPHIL NFR BLD: 1 % (ref 0–3)
ERYTHROCYTE [DISTWIDTH] IN BLOOD BY AUTOMATED COUNT: 23.9 % (ref 11.5–14.5)
FIBRINOGEN PPP-MCNC: CLEAR MG/DL
GFR SERPLBLD BASED ON 1.73 SQ M-ARVRAT: 95.6 ML/MIN
GLUCOSE SERPL-MCNC: 104 MG/DL (ref 70–99)
GLUCOSE UR STRIP-MCNC: (no result) MG/DL
HCT VFR BLD CALC: 31.3 % (ref 36–47)
HDLC SERPL-MCNC: 141 MG/DL (ref 40–60)
HGB BLD-MCNC: 10.1 G/DL (ref 12–15.5)
HYPOCHROMIA BLD QL SMEAR: (no result)
LDLC: 63 MG/DL (ref 0–100)
LIPASE: 111 U/L (ref 73–393)
LYMPHOCYTES # BLD: 0.9 X10^3/UL (ref 1–4.8)
LYMPHOCYTES NFR BLD AUTO: 27 % (ref 24–48)
MAGNESIUM SERPL-MCNC: 2 MG/DL (ref 1.8–2.4)
MCH RBC QN AUTO: 28 PG (ref 25–35)
MCHC RBC AUTO-ENTMCNC: 32 G/DL (ref 31–37)
MCV RBC AUTO: 87 FL (ref 79–100)
METHADONE SERPL-MCNC: (no result) NG/ML
MONO #: 0.5 X10^3/UL (ref 0–1.1)
MONOCYTES NFR BLD: 13 % (ref 0–9)
NEUT #: 1.9 X10^3UL (ref 1.8–7.7)
NEUTROPHILS NFR BLD AUTO: 57 % (ref 31–73)
NITRITE UR QL STRIP: (no result)
OPIATES UR-MCNC: (no result) NG/ML
PCP SERPL-MCNC: (no result) MG/DL
PLATELET # BLD AUTO: 278 X10^3/UL (ref 140–400)
PLATELET # BLD EST: ADEQUATE 10*3/UL
POTASSIUM SERPL-SCNC: 4 MMOL/L (ref 3.5–5.1)
PROT SERPL-MCNC: 7.3 G/DL (ref 6.4–8.2)
RBC # BLD AUTO: 3.6 X10^6/UL (ref 3.5–5.4)
RBC #/AREA URNS HPF: (no result) /HPF (ref 0–2)
SODIUM SERPL-SCNC: 137 MMOL/L (ref 136–145)
SP GR UR STRIP: 1.01
SQUAMOUS #/AREA URNS LPF: (no result) /LPF
THYROID STIM HORMONE (TSH): 31.54 UIU/ML (ref 0.36–3.74)
TRIGL SERPL-MCNC: 92 MG/DL (ref 0–150)
UROBILINOGEN UR-MCNC: 0.2 MG/DL
VLDLC: 18 MG/DL (ref 0–40)
WBC # BLD AUTO: 3.4 X10^3/UL (ref 4–11)
WBC #/AREA URNS HPF: (no result) /HPF (ref 0–4)

## 2022-01-06 NOTE — RAD
EXAM: CT HEAD WITHOUT IV CONTRAST



CLINICAL HISTORY: Reason: HA, Dizzy - hx brain aneursym, thyroid ca- s/p surgery / Spl. Instructions:
  / History: 



COMPARISON: None.



TECHNIQUE:

Routine CT of the head without contrast. Soft tissues and bone windows were reviewed.



PQRS compliance statement - One or more of the following individualized dose reduction techniques wer
e utilized for this study:

1.  Automated exposure control

2.  Adjustment of the mA and/or kV according to patient size

3.  Use of iterative reconstruction technique



FINDINGS:  

There is no evidence of hemorrhage, mass or extra-axial fluid collection.



Grey-white differentiation is maintained with no evidence of edema. 



There is no mass effect or shift of the intracranial structures.



The ventricles, basilar cisterns and cortical sulci are normal in size and configuration for the amarjit
ents stated age.



The cerebellum and brainstem are unremarkable.  



The calvarium demonstrates no evidence of fracture or focal lesion.



There is normal aeration of the visualized paranasal sinuses and mastoid air cells.



The visualized portions of the orbits are normal.







IMPRESSION:

No evidence for acute intracranial process.





___________________________________

EXAM: CT CERVICAL SPINE WITHOUT IV CONTRAST



CLINICAL HISTORY: Reason: HA, Dizzy - hx brain aneursym, thyroid ca- s/p surgery / Spl. Instructions:
  / History: 



COMPARISON: None available.



TECHNIQUE: Helical CT of the cervical spine was performed. Axial, coronal and sagittal reformatted im
ages were also performed.



PQRS compliance statement - One or more of the following individualized dose reduction techniques wer
e utilized for this study:

1.  Automated exposure control

2.  Adjustment of the mA and/or kV according to patient size

3.  Use of iterative reconstruction technique



FINDINGS: 





Vertebral body heights are preserved.



No spondylolisthesis.



Intervertebral disc heights are preserved.







IMPRESSION:

No acute cervical spine fracture or subluxation.



Electronically signed by: Sukumar Thompson MD (1/6/2022 1:06 AM) [facility]

## 2022-01-06 NOTE — EKG
[facility] [address], [place] [address]

Test Date:    2022               Test Time:    01:32:12

Pat Name:     MASOUD MOREIRA            Department:   

Patient ID:   [id number]           Room:          

Gender:       F                        Technician:   

:          1980               Requested By: CHARLES CHANG

Order Number: [id number]            Reading MD:   Sergio Blount

                                 Measurements

Intervals                              Axis          

Rate:         63                       P:            90

NH:           178                      QRS:          45

QRSD:         88                       T:            34

QT:           400                                    

QTc:          412                                    

                           Interpretive Statements

SINUS RHYTHM

NORMAL ECG

RI6.02

Compared to ECG 2022 23:20:39

No significant changes

Electronically Signed On 2022 14:24:55 CST by Sergio Blount

## 2022-01-06 NOTE — RAD
EXAM: CT chest with contrast - pulmonary embolus protocol



CLINICAL HISTORY:  Chest pain



COMPARISON: None.



TECHNIQUE: CT of the chest following the administration of  intravenous contrast during the pulmonary
 arterial phase. Axial, coronal and sagittal reformatted images were generated including MIP images.



---PQRS compliance statement - One or more of the following individualized dose reduction techniques 
were utilized for this study:

1.  Automated exposure control

2.  Adjustment of the mA and/or kV according to patient size

3.  Use of iterative reconstruction technique---



FINDINGS:

CHEST: 

Diagnostic quality: Adequate. 

Pulmonary emboli:  None seen

Right heart strain: None

Pulmonary arteries: Normal in caliber.



Heart is not enlarged. No pericardial effusion. Mild dilation ascending aorta measuring up to 3.9 cm.




12 mm groundglass nodule in the lingula. Linear opacities in lower lobes likely scarring/atelectasis.




No mediastinal or hilar lymphadenopathy. No axillary lymphadenopathy.



Visualized Upper abdomen:  Unremarkable



Bones: No aggressive osseous lesion. Evaluation limited given MIP reconstructions. Diminutive appeara
nce of the left first rib, chronic.



IMPRESSION:

1.  No evidence for acute pulmonary embolus.

2.  12 mm groundglass nodule in the lingula. Per Fleischner Society guidelines for incidentally found
 solitary ground-glass nodule, follow-up CT is recommended in 6-12 months, then every 2 years until 5
 years.

3.  3.9 cm dilation of the ascending aorta.



Electronically signed by: Sukumar Thompson MD (1/6/2022 1:12 AM) [facility]

## 2022-01-06 NOTE — RAD
EXAM: AP View of the chest



DATE: 1/5/2022 11:10 PM



INDICATION: Reason: cp / Spl. Instructions:  / History:  Chest pain



COMPARISON: 12/14/2021



FINDINGS:





The heart is not enlarged.



Mediastinal and hilar contours are normal.



No focal parenchymal airspace opacity.



No pleural effusion or pneumothorax.







IMPRESSION:

1.  No radiographic evidence for acute cardiopulmonary process.



Electronically signed by: Sukumar Thompson MD (1/6/2022 12:46 AM) [facility]

## 2022-01-14 ENCOUNTER — HOSPITAL ENCOUNTER (EMERGENCY)
Dept: HOSPITAL 63 - ER | Age: 42
Discharge: HOME | End: 2022-01-14
Payer: MEDICAID

## 2022-01-14 VITALS — SYSTOLIC BLOOD PRESSURE: 113 MMHG | DIASTOLIC BLOOD PRESSURE: 77 MMHG

## 2022-01-14 VITALS — BODY MASS INDEX: 29.89 KG/M2 | HEIGHT: 72 IN | WEIGHT: 220.68 LBS

## 2022-01-14 DIAGNOSIS — E03.9: ICD-10-CM

## 2022-01-14 DIAGNOSIS — Z91.018: ICD-10-CM

## 2022-01-14 DIAGNOSIS — F41.9: ICD-10-CM

## 2022-01-14 DIAGNOSIS — Z88.5: ICD-10-CM

## 2022-01-14 DIAGNOSIS — Z88.0: ICD-10-CM

## 2022-01-14 DIAGNOSIS — R07.2: Primary | ICD-10-CM

## 2022-01-14 LAB
ALBUMIN SERPL-MCNC: 4.1 G/DL (ref 3.4–5)
ALBUMIN/GLOB SERPL: 1.2 {RATIO} (ref 1–1.7)
ALP SERPL-CCNC: 62 U/L (ref 46–116)
ALT SERPL-CCNC: 25 U/L (ref 14–59)
AMPHETAMINE/METHAMPHETAMINE: (no result)
ANION GAP SERPL CALC-SCNC: 13 MMOL/L (ref 6–14)
ANISOCYTOSIS BLD QL SMEAR: (no result)
APTT PPP: YELLOW S
AST SERPL-CCNC: 32 U/L (ref 15–37)
BACTERIA #/AREA URNS HPF: 0 /HPF
BARBITURATES UR-MCNC: (no result) UG/ML
BASOPHILS # BLD AUTO: 0.1 X10^3/UL (ref 0–0.2)
BASOPHILS NFR BLD: 1 % (ref 0–3)
BENZODIAZ UR-MCNC: (no result) UG/L
BILIRUB SERPL-MCNC: 0.6 MG/DL (ref 0.2–1)
BILIRUB UR QL STRIP: (no result)
BUN/CREAT SERPL: 13 (ref 6–20)
CA-I SERPL ISE-MCNC: 12 MG/DL (ref 7–20)
CALCIUM SERPL-MCNC: 8.2 MG/DL (ref 8.5–10.1)
CANNABINOIDS UR-MCNC: (no result) UG/L
CHLORIDE SERPL-SCNC: 95 MMOL/L (ref 98–107)
CO2 SERPL-SCNC: 25 MMOL/L (ref 21–32)
COCAINE UR-MCNC: (no result) NG/ML
CREAT SERPL-MCNC: 0.9 MG/DL (ref 0.6–1)
EOSINOPHIL NFR BLD: 0 X10^3/UL (ref 0–0.7)
EOSINOPHIL NFR BLD: 1 % (ref 0–3)
ERYTHROCYTE [DISTWIDTH] IN BLOOD BY AUTOMATED COUNT: 23.1 % (ref 11.5–14.5)
FIBRINOGEN PPP-MCNC: CLEAR MG/DL
GFR SERPLBLD BASED ON 1.73 SQ M-ARVRAT: 83.5 ML/MIN
GLOBULIN SER-MCNC: 3.4 G/DL (ref 2.2–3.8)
GLUCOSE SERPL-MCNC: 77 MG/DL (ref 70–99)
GLUCOSE UR STRIP-MCNC: (no result) MG/DL
HCT VFR BLD CALC: 30.1 % (ref 36–47)
HGB BLD-MCNC: 9.6 G/DL (ref 12–15.5)
HYPOCHROMIA BLD QL SMEAR: (no result)
LYMPHOCYTES # BLD: 1.6 X10^3/UL (ref 1–4.8)
LYMPHOCYTES NFR BLD AUTO: 32 % (ref 24–48)
MCH RBC QN AUTO: 28 PG (ref 25–35)
MCHC RBC AUTO-ENTMCNC: 32 G/DL (ref 31–37)
MCV RBC AUTO: 87 FL (ref 79–100)
METHADONE SERPL-MCNC: (no result) NG/ML
MONO #: 0.4 X10^3/UL (ref 0–1.1)
MONOCYTES NFR BLD: 8 % (ref 0–9)
NEUT #: 2.9 X10^3UL (ref 1.8–7.7)
NEUTROPHILS NFR BLD AUTO: 57 % (ref 31–73)
NITRITE UR QL STRIP: (no result)
OPIATES UR-MCNC: (no result) NG/ML
PCP SERPL-MCNC: (no result) MG/DL
PLATELET # BLD AUTO: 318 X10^3/UL (ref 140–400)
PLATELET # BLD EST: ADEQUATE 10*3/UL
POTASSIUM SERPL-SCNC: 4.1 MMOL/L (ref 3.5–5.1)
PROT SERPL-MCNC: 7.5 G/DL (ref 6.4–8.2)
RBC # BLD AUTO: 3.47 X10^6/UL (ref 3.5–5.4)
RBC #/AREA URNS HPF: 0 /HPF (ref 0–2)
SODIUM SERPL-SCNC: 133 MMOL/L (ref 136–145)
SP GR UR STRIP: 1.01
SQUAMOUS #/AREA URNS LPF: (no result) /LPF
STOMATOCYTES BLD QL SMEAR: (no result)
TARGETS BLD QL SMEAR: (no result)
UROBILINOGEN UR-MCNC: 0.2 MG/DL
WBC # BLD AUTO: 5.1 X10^3/UL (ref 4–11)
WBC #/AREA URNS HPF: 0 /HPF (ref 0–4)

## 2022-01-14 PROCEDURE — 96374 THER/PROPH/DIAG INJ IV PUSH: CPT

## 2022-01-14 PROCEDURE — 93005 ELECTROCARDIOGRAM TRACING: CPT

## 2022-01-14 PROCEDURE — 85025 COMPLETE CBC W/AUTO DIFF WBC: CPT

## 2022-01-14 PROCEDURE — 80307 DRUG TEST PRSMV CHEM ANLYZR: CPT

## 2022-01-14 PROCEDURE — 36415 COLL VENOUS BLD VENIPUNCTURE: CPT

## 2022-01-14 PROCEDURE — 81025 URINE PREGNANCY TEST: CPT

## 2022-01-14 PROCEDURE — 84484 ASSAY OF TROPONIN QUANT: CPT

## 2022-01-14 PROCEDURE — 99285 EMERGENCY DEPT VISIT HI MDM: CPT

## 2022-01-14 PROCEDURE — 80053 COMPREHEN METABOLIC PANEL: CPT

## 2022-01-14 PROCEDURE — 71045 X-RAY EXAM CHEST 1 VIEW: CPT

## 2022-01-14 PROCEDURE — 81001 URINALYSIS AUTO W/SCOPE: CPT

## 2022-01-14 NOTE — EKG
[facility] [address], [place] [address]

Test Date:    2022               Test Time:    20:37:30

Pat Name:     MASOUD MOREIRA            Department:   

Patient ID:   [id number]           Room:          

Gender:       F                        Technician:   

:          1980               Requested By: ROSALINO OCONNOR

Order Number: [id number]            Reading MD:   Paul Cherry

                                 Measurements

Intervals                              Axis          

Rate:         69                       P:            90

NM:           164                      QRS:          59

QRSD:         84                       T:            56

QT:           372                                    

QTc:          400                                    

                           Interpretive Statements

SINUS RHYTHM

NORMAL ECG

RI6.02

Compared to ECG 2022 01:32:12

No significant changes

Electronically Signed On 1- 15:02:11 CST by Paul Cherry

## 2022-01-14 NOTE — RAD
AP chest



HISTORY: Chest pain



AP view was taken of the chest. Lungs are free of infiltrates. Heart is normal in size. There is no e
ffusion.



IMPRESSION:

1. No acute chest disease.



Electronically signed by: Ari Kay MD (1/14/2022 9:19 PM) [facility]

## 2022-01-14 NOTE — PHYS DOC
Past History


Past Medical History:  Anxiety, Hypothyroid


Additional Past Medical Histor:  Thyroid ablation, COVID-19


 (ROSALINO OCONNOR)


Past Surgical History:  , Tubal ligation, Other


Additional Past Surgical Histo:  UMBILICAL HERNIA


 (ROSALINO OCONNOR)


Smoking:  Non-smoker


Alcohol Use:  None


Drug Use:  None


 (ROSALINO OCONNOR)





General Adult


EDM:


Chief Complaint:  CHEST PAIN





HPI:


HPI:


Patient is a 41-year-old female who presents to the emergency department for 

intermittent midsternal chest pain that has been going on since being diagnosed 

with COVID-19 in October.  Patient has been seen previously multiple times in 

this emergency department and in her primary care provider's office for the 

symptoms.  She was diagnosed with pleuritic chest pain and advised to take 

naproxen and Tylenol at home.  Patient reports that her chest pain is radiating 

to her left shoulder.  It is 7 out of 10.  She states that it is worse with 

eating and drinking.  No alleviating factors.  She denies any shortness of 

breath, nausea, vomiting, cough, fever.


 (ROSALINO OCONNOR)





Review of Systems:


Review of Systems:


Constitutional:  negative unless reported in HPI


Eyes:  negative unless reported in HPI


HENT:  negative unless reported in HPI


Respiratory:  negative unless reported in HPI


Cardiovascular:  negative unless reported in HPI


GI:  negative unless reported in HPI


: negative unless reported in HPI


Musculoskeletal: negative unless reported in HPI


Integument:  negative unless reported in HPI


Neurologic:  negative unless reported in HPI


Endocrine: negative unless reported in HPI


Lymphatic:  negative unless reported in HPI


Psychiatric:  negative unless reported in HPI


 (ROSALINO OCONNOR)





Current Medications:


Current Meds:





Current Medications








 Medications


  (Trade)  Dose


 Ordered  Sig/Alexandre  Start Time


 Stop Time Status Last Admin


Dose Admin


 


 Sodium Chloride  1,000 ml @ 


 1,000 mls/hr  Q1H  22 20:45


 22 21:44   











 (ROSALINO OCONNOR)





Allergies:


Allergies:





Allergies








Coded Allergies Type Severity Reaction Last Updated Verified


 


  Penicillins Allergy Unknown  22 Yes


 


  morphine Allergy Unknown  22 Yes


 


  mushroom Allergy Unknown  22 Yes


 


Uncoded Allergies Type Severity Reaction Last Updated Verified


 


  retinol A Allergy Unknown  22 








 (ROSALINO OCONNOR)





Physical Exam:


PE:





Constitutional: Well developed, well nourished, no acute distress, non-toxic 

appearance. []


HENT: Normocephalic, atraumatic, bilateral external ears normal, oropharynx m

oist, no oral exudates, nose normal. []


Eyes: PERRL, EOMI, conjunctiva normal, no discharge. [] 


Neck: Normal range of motion, no stridor


Cardiovascular:Heart rate regular rhythm, no murmur, chest pain reproducible 

with palpation []


Lungs & Thorax:  Bilateral breath sounds clear to auscultation []


Abdomen: Bowel sounds normal, soft, no tenderness, no masses, no pulsatile 

masses. [] 


Skin: Warm, dry, no erythema, no rash. [] 


Back: Normal range of motion


Extremities: No tenderness, no cyanosis, no clubbing, ROM intact, no edema. [] 


Neurologic: Alert and oriented X 3, normal motor function, normal sensory 

function, no focal deficits noted. []


Psychologic: Affect normal, judgement normal, mood normal. []


 (ROSALINO OCONNOR)





Current Patient Data:


Labs:





Laboratory Tests








Test


 22


20:55 22


21:05


 


White Blood Count 5.1 x10^3/uL  


 


Red Blood Count 3.47 x10^6/uL  


 


Hemoglobin 9.6 g/dL  


 


Hematocrit 30.1 %  


 


Mean Corpuscular Volume 87 fL  


 


Mean Corpuscular Hemoglobin 28 pg  


 


Mean Corpuscular Hemoglobin


Concent 32 g/dL 


 





 


Red Cell Distribution Width 23.1 %  


 


Platelet Count 318 x10^3/uL  


 


Neutrophils (%) (Auto) 57 %  


 


Lymphocytes (%) (Auto) 32 %  


 


Monocytes (%) (Auto) 8 %  


 


Eosinophils (%) (Auto) 1 %  


 


Basophils (%) (Auto) 1 %  


 


Neutrophils # (Auto) 2.9 x10^3uL  


 


Lymphocytes # (Auto) 1.6 x10^3/uL  


 


Monocytes # (Auto) 0.4 x10^3/uL  


 


Eosinophils # (Auto) 0.0 x10^3/uL  


 


Basophils # (Auto) 0.1 x10^3/uL  


 


Platelet Estimate Pending  


 


Sodium Level 133 mmol/L  


 


Potassium Level 4.1 mmol/L  


 


Chloride Level 95 mmol/L  


 


Carbon Dioxide Level 25 mmol/L  


 


Anion Gap 13  


 


Blood Urea Nitrogen 12 mg/dL  


 


Creatinine 0.9 mg/dL  


 


Estimated GFR


(Cockcroft-Gault) 83.5 


 





 


BUN/Creatinine Ratio 13  


 


Glucose Level 77 mg/dL  


 


Calcium Level 8.2 mg/dL  


 


Total Bilirubin 0.6 mg/dL  


 


Aspartate Amino Transf


(AST/SGOT) 32 U/L 


 





 


Alanine Aminotransferase


(ALT/SGPT) 25 U/L 


 





 


Alkaline Phosphatase 62 U/L  


 


Troponin I High Sensitivity < 4 ng/L  


 


Total Protein 7.5 g/dL  


 


Albumin 4.1 g/dL  


 


Albumin/Globulin Ratio 1.2  


 


Urine Opiates Screen Neg  


 


Urine Methadone Screen Neg  


 


Urine Barbiturates Neg  


 


Urine Phencyclidine Screen Neg  


 


Urine


Amphetamine/Methamphetamine Neg 


 





 


Urine Benzodiazepines Screen Neg  


 


Urine Cocaine Screen Neg  


 


Urine Cannabinoids Screen Neg  


 


Urine Ethyl Alcohol Neg  


 


Bedside Urine HCG, Qualitative  hcg negative 








Current Medications








 Medications


  (Trade)  Dose


 Ordered  Sig/Alexandre


 Route


 PRN Reason  Start Time


 Stop Time Status Last Admin


Dose Admin


 


 Sodium Chloride  1,000 ml @ 


 1,000 mls/hr  Q1H


 IV


   22 20:45


 22 21:44   





 


 Multi-Ingredient


 Mouthwash/Gargle


  (Gi Cocktail)  20 ml  1X  ONCE


 PO


   22 21:00


 22 21:01 DC 22 21:14











 (ROSALINO OCONNOR)





EKG:


EKG:


EKG performed by ER staff at  shows sinus rhythm with a rate of 69, QTc of 

400, no STEMI read by Dr. Mendieta.  []


 (ROSALINO OCONNOR)





Radiology/Procedures:


Radiology/Procedures:


[]PROCEDURE: PORTABLE CHEST 1V





AP chest





HISTORY: Chest pain





AP view was taken of the chest. Lungs are free of infiltrates. Heart is normal 

in size. There is no effusion.





IMPRESSION:


1. No acute chest disease.





Electronically signed by: Ari Kay MD (2022 9:19 PM) [facility]














DICTATED AND SIGNED BY:     ARI KAY MD


DATE:     22





CC: ROSALINO OCONNOR; LIAM RUIZ MD ~MTH0 0


 (ROSALINO OCONNOR)





Heart Score:


C/O Chest Pain:  Yes


HEART Score for Chest Pain:  








HEART Score for Chest Pain Response (Comments) Value


 


History Slighlty/Non-Suspicious 0


 


ECG Normal 0


 


Age < 45 0


 


Risk Factors No Risk Factors 0


 


Troponin < Normal Limit 0


 


Total  0








Risk Factors:


Risk Factors:  DM, Current or recent (<one month) smoker, HTN, HLP, family 

history of CAD, obesity.


Risk Scores:


Score 0 - 3:  2.5% MACE over next 6 weeks - Discharge Home


Score 4 - 6:  20.3% MACE over next 6 weeks - Admit for Clinical Observation


Score 7 - 10:  72.7% MACE over next 6 weeks - Early Invasive Strategies


 (ROSALINO OCONNOR)





Course & Med Decision Making:


Course & Med Decision Making


Pertinent Labs and Imaging studies reviewed. (See chart for details)





Patient presents to the emergency department for midsternal chest pain that 

radiates to her left shoulder that has been intermittent for 3 months after 

being diagnosed with COVID-19.  Patient has been seen and evaluated by this 

multiple times in this emergency department.  Patient reports that the pain is 

worse after eating so she was treated with a GI cocktail.  Work-up in the ER 

also consisted of blood work, EKG and chest x-ray.


Patient's blood work was unremarkable.  Chest x-ray showed no acute findings.   

Patient advised to follow-up with her primary care provider regarding her 

chronic atypical chest pain.  Patient's vital signs are stable and she is in no 

acute distress. Heart score is 0. Patient has been referred to cardiology for 

additional testing.  I discussed with patient all findings and diagnostic te

sting as well as the need to follow-up with PCP for further evaluation and 

treatment or return to the ER if any new or worsening symptoms.  Strict return 

precautions were also discussed at length.  Patient voiced understanding and 

agreement with the plan.  Patient is hemodynamically stable at the time of 

disposition.


 (ROSALINO OCONNOR)


Course & Med Decision Making


Did not see or evaluate patient.  Did not discuss patient with NP.  Agree with 

NP's work-up and disposition per note


 (CHARISMA MENDIETA MD)


Dragon Disclaimer:


Dragon Disclaimer:


This electronic medical record was generated, in whole or in part, using a voice

 recognition dictation system.


 (ROSALINO OCONNOR)





Departure


Departure:


Impression:  


   Primary Impression:  


   Atypical chest pain


Disposition:   HOME / SELF CARE / HOMELESS


Condition:  GOOD


Referrals:  


LIAM RUIZ MD (PCP)


Patient Instructions:  Chest Pain (Nonspecific)





Additional Instructions:  


You were seen in the emergency department today for chest pain.  Your work-up in

 the ER was unremarkable.  At this time, does not appear that you are 

experiencing acute coronary syndrome.  However if you return home and you 

continue to have chest pain or it gets worse you need to return to the emergency

 department for reevaluation.  Please follow-up with the cardiologist regarding 

your chronic chest pain.  You can follow-up with [facility] 

cardiology group by calling [phone].  Please call them on Monday to set up 

a follow-up appointment.  I would also advise you to contact Dr. Ruiz 

regarding your ER visit, I would advise you to have a stress test.  Continue to 

take your naproxen and Tylenol for your chest pain.  Return to the emergency 

department if you develop worsening of your chest pain, shortness of breath, 

intractable nausea or vomiting, dizziness, palpitations, high fevers refractory 

to treatment or any new or worsening concerns.











ROSALINO OCONNOR          2022 20:48


CHARISMA MENDIETA MD               2022 21:46

## 2022-02-03 ENCOUNTER — HOSPITAL ENCOUNTER (EMERGENCY)
Dept: HOSPITAL 61 - ER | Age: 42
Discharge: HOME | End: 2022-02-03
Payer: MEDICAID

## 2022-02-03 VITALS
DIASTOLIC BLOOD PRESSURE: 96 MMHG | DIASTOLIC BLOOD PRESSURE: 96 MMHG | SYSTOLIC BLOOD PRESSURE: 140 MMHG | SYSTOLIC BLOOD PRESSURE: 140 MMHG

## 2022-02-03 VITALS — BODY MASS INDEX: 29.59 KG/M2 | WEIGHT: 218.48 LBS | HEIGHT: 72 IN

## 2022-02-03 DIAGNOSIS — Z88.5: ICD-10-CM

## 2022-02-03 DIAGNOSIS — R51.9: Primary | ICD-10-CM

## 2022-02-03 DIAGNOSIS — Z88.0: ICD-10-CM

## 2022-02-03 LAB
ALBUMIN SERPL-MCNC: 3.9 G/DL (ref 3.4–5)
ALBUMIN/GLOB SERPL: 1.1 {RATIO} (ref 1–1.7)
ALP SERPL-CCNC: 65 U/L (ref 46–116)
ALT SERPL-CCNC: 44 U/L (ref 14–59)
ANION GAP SERPL CALC-SCNC: 10 MMOL/L (ref 6–14)
ANISOCYTOSIS BLD QL SMEAR: (no result)
AST SERPL-CCNC: 69 U/L (ref 15–37)
BASO STIPL BLD QL SMEAR: PRESENT
BASOPHILS # BLD AUTO: 0.1 X10^3/UL (ref 0–0.2)
BASOPHILS NFR BLD: 2 % (ref 0–3)
BILIRUB SERPL-MCNC: 0.5 MG/DL (ref 0.2–1)
BUN SERPL-MCNC: 6 MG/DL (ref 7–20)
BUN/CREAT SERPL: 8 (ref 6–20)
CALCIUM SERPL-MCNC: 8 MG/DL (ref 8.5–10.1)
CHLORIDE SERPL-SCNC: 100 MMOL/L (ref 98–107)
CO2 SERPL-SCNC: 25 MMOL/L (ref 21–32)
CREAT SERPL-MCNC: 0.8 MG/DL (ref 0.6–1)
DACRYOCYTES BLD QL SMEAR: (no result)
EOSINOPHIL NFR BLD: 0.1 X10^3/UL (ref 0–0.7)
EOSINOPHIL NFR BLD: 2 % (ref 0–3)
ERYTHROCYTE [DISTWIDTH] IN BLOOD BY AUTOMATED COUNT: 22.6 % (ref 11.5–14.5)
GFR SERPLBLD BASED ON 1.73 SQ M-ARVRAT: 79 ML/MIN
GLUCOSE SERPL-MCNC: 86 MG/DL (ref 70–99)
HCT VFR BLD CALC: 30.6 % (ref 36–47)
HGB BLD-MCNC: 9.8 G/DL (ref 12–15.5)
LYMPHOCYTES # BLD: 1.2 X10^3/UL (ref 1–4.8)
LYMPHOCYTES NFR BLD AUTO: 41 % (ref 24–48)
MCH RBC QN AUTO: 28 PG (ref 25–35)
MCHC RBC AUTO-ENTMCNC: 32 G/DL (ref 31–37)
MCV RBC AUTO: 87 FL (ref 79–100)
MONO #: 0.2 X10^3/UL (ref 0–1.1)
MONOCYTES NFR BLD: 8 % (ref 0–9)
NEUT #: 1.4 X10^3/UL (ref 1.8–7.7)
NEUTROPHILS NFR BLD AUTO: 47 % (ref 31–73)
OVALOCYTES BLD QL SMEAR: (no result)
PLATELET # BLD AUTO: 212 X10^3/UL (ref 140–400)
PLATELET # BLD EST: ADEQUATE 10*3/UL
POIKILOCYTOSIS BLD QL SMEAR: SLIGHT
POTASSIUM SERPL-SCNC: 3.5 MMOL/L (ref 3.5–5.1)
PROT SERPL-MCNC: 7.5 G/DL (ref 6.4–8.2)
RBC # BLD AUTO: 3.51 X10^6/UL (ref 3.5–5.4)
SODIUM SERPL-SCNC: 135 MMOL/L (ref 136–145)
STOMATOCYTES BLD QL SMEAR: (no result)
TARGETS BLD QL SMEAR: (no result)
WBC # BLD AUTO: 2.9 X10^3/UL (ref 4–11)

## 2022-02-03 PROCEDURE — 96374 THER/PROPH/DIAG INJ IV PUSH: CPT

## 2022-02-03 PROCEDURE — 96375 TX/PRO/DX INJ NEW DRUG ADDON: CPT

## 2022-02-03 PROCEDURE — 80053 COMPREHEN METABOLIC PANEL: CPT

## 2022-02-03 PROCEDURE — 85025 COMPLETE CBC W/AUTO DIFF WBC: CPT

## 2022-02-03 PROCEDURE — 70496 CT ANGIOGRAPHY HEAD: CPT

## 2022-02-03 PROCEDURE — 70450 CT HEAD/BRAIN W/O DYE: CPT

## 2022-02-03 PROCEDURE — 99285 EMERGENCY DEPT VISIT HI MDM: CPT

## 2022-02-03 PROCEDURE — 36415 COLL VENOUS BLD VENIPUNCTURE: CPT

## 2022-02-03 NOTE — RAD
Exam: CT head



INDICATION: Headache



TECHNIQUE: Sequential axial images through the head were obtained without the administration of IV co
ntrast.



Exposure: One or more of the following in the visualized dose reduction techniques were utilized for 
this examination:

1.  Automated exposure control

2.  Adjustment of the MA and/or KV according to patient size

3.  Use of iterative of reconstructive technique





Comparisons: None



FINDINGS:

No focal parenchymal lesion or hemorrhage is identified. There is no midline shift or sulcal effaceme
nt.



No acute vascular territory infarction is identified. Gray-white distinction is preserved.



The ventricular system is within normal limits without compression hydrocephalus. The basal cisterns 
are well maintained.



The visualized portions of the paranasal sinuses and mastoid air cells are well-pneumatized. No acute
 fractures.



IMPRESSION:

No acute intracranial abnormality.



Electronically signed by: Annmarie Talavera MD (2/3/2022 4:22 PM) [facility]

## 2022-02-03 NOTE — PHYS DOC
Past Medical History


Past Surgical History:  , Tubal ligation


Additional Past Surgical Histo:  umbilical hernia repair





General Adult


EDM:


Chief Complaint:  HEADACHE





HPI:


HPI:


Patient is a 41-year-old female who presents to the emergency department for 

posterior head pain that started this morning.  She rates her pain 9 out of 10. 

Pain does not radiate.  She took naproxen and Tylenol for her symptoms.  Patient

does have a history of headaches.  The last time she had a headache was in 

October of last year she saw her primary care provider and had a CT angio of her

brain performed which did show a 2 x 2 millimeter aneurysm.  Patient was 

referred to [facility] and she states she saw a neurologist who performed

a repeat image and the aneurysm was not better.  Patient states that they told 

her to continue to follow-up with her primary care provider.  Associated with th

e headache patient is reporting nausea and dizziness.  She denies vomiting, 

photophobia, phonophobia, fevers, injuries.  She reports that this headache 

feels different than the previous headache in which the aneurysm was found.  She

denies  thunderclap headache.





Review of Systems:


Review of Systems:


Constitutional:  negative unless reported in HPI


Eyes:  negative unless reported in HPI


HENT:  negative unless reported in HPI


Respiratory:  negative unless reported in HPI


Cardiovascular:  negative unless reported in HPI


GI:  negative unless reported in HPI


: negative unless reported in HPI


Musculoskeletal: negative unless reported in HPI


Integument:  negative unless reported in HPI


Neurologic:  negative unless reported in HPI


Endocrine: negative unless reported in HPI


Lymphatic:  negative unless reported in HPI


Psychiatric:  negative unless reported in HPI





Heart Score:


C/O Chest Pain:  N/A


Risk Factors:


Risk Factors:  DM, Current or recent (<one month) smoker, HTN, HLP, family 

history of CAD, obesity.


Risk Scores:


Score 0 - 3:  2.5% MACE over next 6 weeks - Discharge Home


Score 4 - 6:  20.3% MACE over next 6 weeks - Admit for Clinical Observation


Score 7 - 10:  72.7% MACE over next 6 weeks - Early Invasive Strategies





Current Medications:





Current Medications








 Medications


  (Trade)  Dose


 Ordered  Sig/Alexandre  Start Time


 Stop Time Status Last Admin


Dose Admin


 


 Diphenhydramine


 HCl


  (Benadryl)  25 mg  1X  ONCE  2/3/22 16:00


 2/3/22 16:01 DC 2/3/22 15:59


25 MG


 


 Ketorolac


 Tromethamine


  (Toradol 30mg


 Vial)  30 mg  1X  ONCE  2/3/22 16:00


 2/3/22 16:01 DC 2/3/22 15:57


30 MG


 


 Prochlorperazine


 Edisylate


  (Compazine)  10 mg  1X  ONCE  2/3/22 16:00


 2/3/22 16:01 DC 2/3/22 15:55


10 MG


 


 Sodium Chloride  1,000 ml @ 


 1,000 mls/hr  1X  ONCE  2/3/22 16:00


 2/3/22 16:59   














Allergies:


Allergies:





Allergies








Coded Allergies Type Severity Reaction Last Updated Verified


 


  Penicillins Allergy Intermediate  10/30/17 Yes


 


  morphine Allergy Intermediate  10/30/17 Yes











Physical Exam:


PE:





Constitutional: Well developed, well nourished, no acute distress, non-toxic 

appearance. []


HENT: Normocephalic, atraumatic, bilateral external ears normal, oropharynx 

moist, no oral exudates, nose normal. []


Eyes: PERRL, 4 mm bilaterally, EOMI, conjunctiva normal, no discharge. [] 


Neck: Normal range of motion, no tenderness, no nucchal rigidity, supple, no 

stridor. [] 


Cardiovascular:Heart rate regular rhythm, no murmur []


Lungs & Thorax:  Bilateral breath sounds clear to auscultation []


Abdomen: Bowel sounds normal, soft, no tenderness, no masses, no pulsatile 

masses. [] 


Skin: Warm, dry, no erythema, no rash. [] 


Back: Normal range of motion


Extremities: No tenderness, no cyanosis, no clubbing, ROM intact, no edema. [] 


Neurologic: Alert and oriented X 3, equal  strengths, patient is moving all 

four extremities equally, no speech changes, normal sensation, no pronator 

drift, no limb ataxia


Psychologic: Affect normal, judgement normal, mood normal. []





Current Patient Data:


Labs:





Laboratory Tests








Test


 2/3/22


16:03


 


White Blood Count 2.9 x10^3/uL 


 


Red Blood Count 3.51 x10^6/uL 


 


Hemoglobin 9.8 g/dL 


 


Hematocrit 30.6 % 


 


Mean Corpuscular Volume 87 fL 


 


Mean Corpuscular Hemoglobin 28 pg 


 


Mean Corpuscular Hemoglobin


Concent 32 g/dL 





 


Red Cell Distribution Width 22.6 % 


 


Platelet Count 212 x10^3/uL 


 


Neutrophils (%) (Auto) 47 % 


 


Lymphocytes (%) (Auto) 41 % 


 


Monocytes (%) (Auto) 8 % 


 


Eosinophils (%) (Auto) 2 % 


 


Basophils (%) (Auto) 2 % 


 


Neutrophils # (Auto) 1.4 x10^3/uL 


 


Lymphocytes # (Auto) 1.2 x10^3/uL 


 


Monocytes # (Auto) 0.2 x10^3/uL 


 


Eosinophils # (Auto) 0.1 x10^3/uL 


 


Basophils # (Auto) 0.1 x10^3/uL 


 


Platelet Estimate Adequate 


 


Poikilocytosis Slight 


 


Basophilic Stippling Present 


 


Anisocytosis Mod 


 


Spherocytes  


 


Target Cells Few 


 


Tear Drop Cells Occ 


 


Ovalocytes Occ 


 


Stomatocytes Few 


 


Sodium Level 135 mmol/L 


 


Potassium Level 3.5 mmol/L 


 


Chloride Level 100 mmol/L 


 


Carbon Dioxide Level 25 mmol/L 


 


Anion Gap 10 


 


Blood Urea Nitrogen 6 mg/dL 


 


Creatinine 0.8 mg/dL 


 


Estimated GFR


(Cockcroft-Gault) 79.0 





 


BUN/Creatinine Ratio 8 


 


Glucose Level 86 mg/dL 


 


Calcium Level 8.0 mg/dL 


 


Total Bilirubin 0.5 mg/dL 


 


Aspartate Amino Transf


(AST/SGOT) 69 U/L 





 


Alanine Aminotransferase


(ALT/SGPT) 44 U/L 





 


Alkaline Phosphatase 65 U/L 


 


Total Protein 7.5 g/dL 


 


Albumin 3.9 g/dL 


 


Albumin/Globulin Ratio 1.1 








Current Medications








 Medications


  (Trade)  Dose


 Ordered  Sig/Alexandre


 Route


 PRN Reason  Start Time


 Stop Time Status Last Admin


Dose Admin


 


 Diphenhydramine


 HCl


  (Benadryl)  25 mg  1X  ONCE


 IVP


   2/3/22 16:00


 2/3/22 16:01 DC 2/3/22 15:59





 


 Ketorolac


 Tromethamine


  (Toradol 30mg


 Vial)  30 mg  1X  ONCE


 IVP


   2/3/22 16:00


 2/3/22 16:01 DC 2/3/22 15:57





 


 Sodium Chloride  1,000 ml @ 


 1,000 mls/hr  1X  ONCE


 IV


   2/3/22 16:00


 2/3/22 16:59 DC  





 


 Prochlorperazine


 Edisylate


  (Compazine)  10 mg  1X  ONCE


 IV


   2/3/22 16:00


 2/3/22 16:01 DC 2/3/22 15:55





 


 Iohexol


  (Omnipaque 350


 Mg/ml)  75 ml  1X  ONCE


 IV


   2/3/22 17:00


 2/3/22 17:07 DC 2/3/22 17:26





 


 Info


  (CONTRAST GIVEN


 -- Rx MONITORING)  1 each  PRN DAILY  PRN


 [facility]


 SEE COMMENTS  2/3/22 17:15


 22 17:14   











Vital Signs:





                                   Vital Signs








  Date Time  Temp Pulse Resp B/P (MAP) Pulse Ox O2 Delivery O2 Flow Rate FiO2


 


2/3/22 15:38 98.5 69 25 140/96 (111) 100 Room Air  





 98.5       











EKG:


EKG:


[]





Radiology/Procedures:


Radiology/Procedures:


[]PROCEDURE: CT HEAD WO CONTRAST





Exam: CT head





INDICATION: Headache





TECHNIQUE: Sequential axial images through the head were obtained without the 

administration of IV contrast.





Exposure: One or more of the following in the visualized dose reduction tech

niques were utilized for this examination:


1.  Automated exposure control


2.  Adjustment of the MA and/or KV according to patient size


3.  Use of iterative of reconstructive technique








Comparisons: None





FINDINGS:


No focal parenchymal lesion or hemorrhage is identified. There is no midline 

shift or sulcal effacement.





No acute vascular territory infarction is identified. Gray-white distinction is 

preserved.





The ventricular system is within normal limits without compression 

hydrocephalus. The basal cisterns are well maintained.





The visualized portions of the paranasal sinuses and mastoid air cells are well-

pneumatized. No acute fractures.





IMPRESSION:


No acute intracranial abnormality.





Electronically signed by: Annmarie Alvarenga MD (2/3/2022 4:22 PM) [facility]














DICTATED and SIGNED BY:     ANNMARIE ALVARENGA MD


DATE:     22 [id number] 0


REASON: headache, hx aneurysm


PROCEDURE: CT ANGIOGRAPHY HEAD





CTA HEAD





History:Reason: headache, hx aneurysm / Spl. Instructions: OMNI 350 INJ. 75 MLS 

/ History: 





Technique: After bolus of intravenous contrast, volumetric CT data acquisition 

was acquired of the head. Multiplanar reconstruction images to include MIP and 

3-D reconstruction images are submitted.  





Exposure: One or more of the following individualized dose reduction techniques 

were utilized for this examination:  


1. Automated exposure control  


2. Adjustment of the mA and/or kV according to patient size  


3. Use of iterative reconstruction technique.





Comparison: MR angiogram 2021 noncontrast head CT February 3, 2020





Any determination of stenosis is based on NASCET criteria.  





Noncontrast CT head:


No intracranial hemorrhage. No mass effect. No hydrocephalus. Extra-axial spaces

 are unremarkable. 





Imaged orbits are unremarkable. Imaged paranasal sinuses and mastoid air cells 

are clear.





Head CTA:


ICA: No stenosis, occlusion or aneurysm.


MCA: No stenosis, occlusion or aneurysm.


LARRY: Unchanged tiny 2 mm right anterior cerebral artery A2 segment aneurysm 

(series 10 image 28). No stenosis or occlusion.


PCA: No stenosis, occlusion or aneurysm.


Basilar artery: No stenosis, occlusion or aneurysm.


Distal vertebral arteries: No stenosis, occlusion or aneurysm.





Impression:


1.  No intracranial arterial stenosis or occlusion.


2.  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





Electronically signed by: Marlo Mcdaniel DO (2/3/2022 5:59 PM) [facility]














DICTATED and SIGNED BY:     MARLO MCDANIEL DO


DATE:     22 [id number] 0





Course & Med Decision Making:


Course & Med Decision Making


Pertinent Labs and Imaging studies reviewed. (See chart for details)





[] Patient presents to the emergency department for posterior head pain that 

started this morning.  Patient does have a history of headaches and had brain 

imaging in October that showed a 2 x 2 millimeter aneurysm.  Patient states she 

followed up with a neurologist at [facility] and that additional brain 

imaging was performed that did not show any aneurysm.  Patient denies 

thunderclap headache, she is reporting nausea and dizziness with her headache, 

no neck stiffness. She had a normal neuro exam.  Work-up in the ER consisted of 

CT imaging of patient's head which showed no acute findings..  Patient treated 

with migraine cocktail.  Following treatment in the emergency department, 

patient reports complete resolution in her headache.  Due to patient's history 

of an aneurysm, CT angio of her head was performed which did show an unchanged 2

 mm aneurysm that was previously seen without any additional findings patient 

was noted to have mild leukopenia.  Due to this and her symptoms of headache, 

she was tested for COVID-19 you will be notified of those results they become 

available..  Patient advised to take Tylenol-year-old follow-up with [facility] if she continues to have the pain.  I discussed with patient all findings

 and diagnostic testing as well as the need to follow-up with PCP for further 

evaluation and treatment or return to the ER if any new or worsening symptoms.  

Strict return precautions were also discussed at length.  Patient voiced 

understanding and agreement with the plan.  Patient is hemodynamically stable at

 the time of disposition.





Dragon Disclaimer:


Dragon Disclaimer:


This electronic medical record was generated, in whole or in part, using a voice

 recognition dictation system.





Departure


Departure


Impression:  


   Primary Impression:  


   Headache


   Qualified Codes:  R51.9 - Headache, unspecified


Disposition:   HOME / SELF CARE / HOMELESS


Condition:  GOOD


Referrals:  


LIAM HAIR MD (PCP)


Patient Instructions:  General Headache Without Cause





Additional Instructions:  


You were seen in the emergency department today for a headache. You were treated

 with a migraine cocktail and reported improvement in symptoms.  CT imaging of 

your head did show an unchanged aneurysm that is approximately 2 mm.   Prior to 

discharge, you were tested for COVID-19.  You will be notified of your results 

when they become available in approximately 1 to 2 days.  Please self isolate 

until you receive these results.  You can take Tylenol for any pain at home.  

Increase your fluids and rest.  You will need to follow-up with [facility] 

regarding this finding.  Please contact them tomorrow to set up a follow-up 

appointment.  Return to the emergency department if you develop worsening of 

your head pain, intractable nausea or vomiting, high fevers refractory to 

treatment, confusion, difficulty walking, poor coordination, speech problems.











ROSALINO OCONNOR           Feb 3, 2022 16:11

## 2022-02-03 NOTE — RAD
CTA HEAD



History:Reason: headache, hx aneurysm / Spl. Instructions: OMNI 350 INJ. 75 MLS / History: 



Technique: After bolus of intravenous contrast, volumetric CT data acquisition was acquired of the 
ad. Multiplanar reconstruction images to include MIP and 3-D reconstruction images are submitted.  



Exposure: One or more of the following individualized dose reduction techniques were utilized for thi
s examination:  

1. Automated exposure control  

2. Adjustment of the mA and/or kV according to patient size  

3. Use of iterative reconstruction technique.



Comparison: MR angiogram September 8, 2021 noncontrast head CT February 3, 2020



Any determination of stenosis is based on NASCET criteria.  



Noncontrast CT head:

No intracranial hemorrhage. No mass effect. No hydrocephalus. Extra-axial spaces are unremarkable. 



Imaged orbits are unremarkable. Imaged paranasal sinuses and mastoid air cells are clear.



Head CTA:

ICA: No stenosis, occlusion or aneurysm.

MCA: No stenosis, occlusion or aneurysm.

LARRY: Unchanged tiny 2 mm right anterior cerebral artery A2 segment aneurysm (series 10 image 28). No 
stenosis or occlusion.

PCA: No stenosis, occlusion or aneurysm.

Basilar artery: No stenosis, occlusion or aneurysm.

Distal vertebral arteries: No stenosis, occlusion or aneurysm.



Impression:

1.  No intracranial arterial stenosis or occlusion.

2.  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



Electronically signed by: Marlo Mcdaniel DO (2/3/2022 5:59 PM) [facility]

## 2022-02-14 ENCOUNTER — HOSPITAL ENCOUNTER (EMERGENCY)
Dept: HOSPITAL 63 - ER | Age: 42
Discharge: HOME | End: 2022-02-14
Payer: MEDICAID

## 2022-02-14 VITALS — WEIGHT: 214.07 LBS | HEIGHT: 72 IN | BODY MASS INDEX: 28.99 KG/M2

## 2022-02-14 VITALS — DIASTOLIC BLOOD PRESSURE: 84 MMHG | SYSTOLIC BLOOD PRESSURE: 141 MMHG

## 2022-02-14 DIAGNOSIS — Z88.8: ICD-10-CM

## 2022-02-14 DIAGNOSIS — F10.10: ICD-10-CM

## 2022-02-14 DIAGNOSIS — Z88.0: ICD-10-CM

## 2022-02-14 DIAGNOSIS — Z88.6: ICD-10-CM

## 2022-02-14 DIAGNOSIS — Z98.51: ICD-10-CM

## 2022-02-14 DIAGNOSIS — F41.9: ICD-10-CM

## 2022-02-14 DIAGNOSIS — R51.9: Primary | ICD-10-CM

## 2022-02-14 PROCEDURE — 81025 URINE PREGNANCY TEST: CPT

## 2022-02-14 PROCEDURE — 70450 CT HEAD/BRAIN W/O DYE: CPT

## 2022-02-14 PROCEDURE — 96372 THER/PROPH/DIAG INJ SC/IM: CPT

## 2022-02-14 PROCEDURE — 99284 EMERGENCY DEPT VISIT MOD MDM: CPT

## 2022-02-14 NOTE — RAD
CT HEAD/BRAIN WO 



Date: 2/14/2022 2:27 AM 



Clinical Indication: New onset HA's 



Comparison: 1/6/2022.



Technique:  5 mm axial tomographic images were obtained of the head without contrast. These were view
ed on brain and bone windows. One or more of the following dose reduction techniques were utilized: A
utomated exposure control (AEC), Adjustment of mA and/or kV according to patient size, Use of iterati
ve reconstruction technique such as ASiR, CT scan done according to ALARA and image gently/image wise
ly



Findings:



The brain parenchyma is normal in attenuation. No intra- or extra-axial mass or fluid collection. No 
acute hemorrhage. The ventricles are normal in size, shape, and morphology. The gray-white matter milad
ction is normal. The subarachnoid cisterns are patent. 



The visualized paranasal sinuses are normal.  The visualized portions of the orbits and globes are no
rmal. The mastoid air cells are clear. 



The  topogram shows no lytic lesion or fracture. 



Impression:



No acute intracranial process.



Electronically signed by: Jerad Angeles MD (2/14/2022 2:55 AM) [facility]

## 2022-02-14 NOTE — PHYS DOC
Past History


Past Medical History:  Anxiety, Hypothyroid


Additional Past Medical Histor:  Thyroid ablation, COVID-19


Past Surgical History:  , Tubal ligation


Additional Past Surgical Histo:  Hernia Repair


Smoking:  Non-smoker


Alcohol Use:  None


Drug Use:  None





Adult General


Chief Complaint


Chief Complaint:  HEADACHE





HPI


HPI


Patient is a 41-year-old female with a past medical history of anxiety and 

endorsed significant alcohol use who presents with a chief complaint of 

headache.  States that she drinks almost daily, drinking today earlier in the 

evening, several drinks.  States if she does not she gets a headache and her 

head feels foggy.  States that this feeling goes away when she drinks.  Denies 

any other history of headaches before this over the last couple of months.  

Denies any recent traumas, travels, illnesses, fevers, changes in vision, neck 

pain, chest pain, shortness of breath, abdominal pain, nausea, vomiting, 

diarrhea.  Denies any numbness/weakness/tingling.





Review of Systems


Review of Systems


Review of systems otherwise unremarkable except noted in HPI





Allergies


Allergies





Allergies








Coded Allergies Type Severity Reaction Last Updated Verified


 


  Penicillins Allergy Unknown  22 Yes


 


  morphine Allergy Unknown  22 Yes


 


  mushroom Allergy Unknown  22 Yes


 


Uncoded Allergies Type Severity Reaction Last Updated Verified


 


  retinol A Allergy Unknown  22 











Physical Exam


Physical Exam





Constitutional: Well developed, well nourished, no acute distress, non-toxic 

appearance. []


HENT: Normocephalic, atraumatic, bilateral external ears normal, oropharynx 

moist, no oral exudates, nose normal. []


Eyes: PERRLA, EOMI, conjunctiva normal, no discharge. [] 


Neck: Normal range of motion, no tenderness, supple, no stridor. [] 


Cardiovascular:Heart rate regular rhythm, no murmur []


Lungs & Thorax:  Bilateral breath sounds clear to auscultation []


Abdomen: Bowel sounds normal, soft, no tenderness, no masses, no pulsatile 

masses. [] 


Skin: Warm, dry, no erythema, no rash. [] 


Extremities: No tenderness, no cyanosis, no clubbing, ROM intact, no edema. [] 


Neurologic: Alert and oriented X 3, normal motor function, normal sensory 

function, able to sit, stand and walk without issue, cranial nerves intact no 

focal deficits noted. []


Psychologic: Affect normal, judgement normal, mood normal. []





Current Patient Data


Vital Signs





                                   Vital Signs








  Date Time  Temp Pulse Resp B/P (MAP) Pulse Ox O2 Delivery O2 Flow Rate FiO2


 


22 01:47 97.9 75 22 150/82 (104) 100 Room Air  











EKG


EKG


[]





Radiology/Procedures


Radiology/Procedures


[]





Heart Score


C/O Chest Pain:  No


Risk Factors:


Risk Factors:  DM, Current or recent (<one month) smoker, HTN, HLP, family 

history of CAD, obesity.


Risk Scores:


Risk Factors:  DM, Current or recent (<one month) smoker, HTN, HLP, family 

history of CAD, obesity.





Course & Med Decision Making


Course & Med Decision Making


Patient is a 41-year-old female who presents with headache and probable alcohol 

use disorder


Vital signs notable for mild hypertension.  Physical exam noted above.  Given 

Tylenol, ibuprofen and Benadryl


Patient requesting CT of the head, CT of the head not concerning.  Discussed 

management of headaches at home.


Discussed ceasing alcohol use, eating appropriate nutritious diet and taking a 

multivitamin daily as well as staying hydrated.


Advised to follow-up in the morning with her primary care physician to discuss 

the alcohol use, further evaluation and treatment and discussion of headaches


Patient grateful, verbalized understanding and agreed with plan of discharge.





Dragon Disclaimer


Dragon Disclaimer


This electronic medical record was generated, in whole or in part, using a voice

 recognition dictation system.





Departure


Departure:


Impression:  


   Primary Impression:  


   Headache


   Additional Impressions:  


   Anxiety about health


   Alcohol abuse


Disposition:  01 HOME / SELF CARE / HOMELESS


Condition:  GOOD


Referrals:  


LIAM HAIR MD (PCP)


Patient Instructions:  Alcohol Problems, Anxiety and Panic Attacks, General 

Headache Without Cause





Additional Instructions:  


Thank you for coming into the emergency department tonight and allowing us to 

take care of you.  Please read the attached information carefully to go over 

things we discussed.  You can use Tylenol, ibuprofen and Benadryl at home as we 

did here.  Please be sure to eat at least 3 nutritious meals a day and take a 

multivitamin.  Please be sure to drink plenty of fluids.  Please try to cease 

drinking alcohol, in a stepwise fashion over the next couple of weeks not 

quitting all at once cold turkey.  Please call your primary care physician in 

the morning as we discussed to update on ED visit, set up a follow-up and 

discuss these issues.  Please come back with new or concerning symptoms as 

discussed.





Problem Qualifiers











CHARISMA MENDIETA MD               2022 02:14

## 2022-05-05 ENCOUNTER — HOSPITAL ENCOUNTER (OUTPATIENT)
Dept: HOSPITAL 61 - ECHO | Age: 42
End: 2022-05-05
Attending: INTERNAL MEDICINE
Payer: MEDICAID

## 2022-05-05 DIAGNOSIS — R07.9: Primary | ICD-10-CM

## 2022-05-05 PROCEDURE — 93350 STRESS TTE ONLY: CPT

## 2022-05-05 PROCEDURE — 93017 CV STRESS TEST TRACING ONLY: CPT

## 2022-05-05 NOTE — CARD
MR#: [id number]

Account#: [id number]

Accession#: [id number]

Date of Study: 05/05/2022

Ordering Physician: NEGRITA CHERRY, 

Referring Physician: NEGRITA CHERRY, 

Bruce: Carmita Ragland MARIA R





--------------- APPROVED REPORT --------------





INDICATION

Chest Pain 



Reason : Patient complained of pain



PROCEDURE

The patient underwent an Exercise Stress Test using the Ternt Protocol. Blood pressure, heart rate, a
nd EKG were monitored.

An Echocardiogram was performed by technician in four stages in quad fashion.  At peak stress four se
lected images were obtained and placed side by side with resting images for comparison.



STRESS ECHO FINDINGS

The resting Echocardiogram showed normal left ventricular systolic contractility with an estimated Ej
ection Fraction of about 60 %. 

The Resting Echocardiogram showed normal augmentation of myocardial wall segments using a 16 segment 
model.

The Stress Echocardiogram showed normal augmentation of myocardial wall segments using a 16 segment m
valeria.

The Stress Echocardiogram left ventricular systolic contractility has an estimated Ejection Fraction 
of about 70%. 



Test Type:          Exercise

Stress Nurse/Tech: BRENDA Curran RN

Test Indications: chest pain

Cardiac History and Allergies: No known cardiac

Medications:     see EMR

Medical History: see EMR

Resting ECG:     SR

Resting Heart Rate: 80 bpm

Resting Blood Pressure: 134/55mmHg



Nurse/Tech Notes

lungs CTA, S1S2

Consent: The procedure was explained to the patient in lay terms. Informed consent was witnessed. Carl
eout was entered into [employer]. History and Stress Test performed by BRENDA Curran RN



Stress Symptoms

No chest pain or symptoms.



POST EXERCISE

Reason for Termination: Reached target heart rate

Max HR: 168 bpm

94% of Maximum Predicted HR: 179 bpm

Max Blood Pressure: 170/68mmHg

Blood Pressure response to exercise: normal response

Heart Rate response to exercise: normal response

Chest Pain: No. 

Arrhythmia: No. 

ST Change: No. 



INTERPRETATION

Stress EKG Conclusion: The resting EKG showed a sinus rhythm with mild nonspecific ST segment changes
.

The stress EKG showed no significant change from baseline.

No EKG evidence of stress-induced ischemia.





<Conclusion>

Good exercise tolerance with the patient walking for 7 minutes and 34 seconds on a Trent protocol.

No EKG evidence of stress-induced ischemia.

Normal LV systolic function at rest.

Normal LV response to exertion with no regional wall motion abnormalities.

Low risk treadmill stress echo.



Signed by : Negrita Cherry MD

Electronically Approved : 05/05/2022 18:15:27